# Patient Record
Sex: MALE | Race: WHITE | NOT HISPANIC OR LATINO
[De-identification: names, ages, dates, MRNs, and addresses within clinical notes are randomized per-mention and may not be internally consistent; named-entity substitution may affect disease eponyms.]

---

## 2017-10-26 PROBLEM — Z00.00 ENCOUNTER FOR PREVENTIVE HEALTH EXAMINATION: Status: ACTIVE | Noted: 2017-10-26

## 2018-06-28 ENCOUNTER — APPOINTMENT (OUTPATIENT)
Dept: MRI IMAGING | Facility: CLINIC | Age: 53
End: 2018-06-28
Payer: COMMERCIAL

## 2018-06-28 ENCOUNTER — OUTPATIENT (OUTPATIENT)
Dept: OUTPATIENT SERVICES | Facility: HOSPITAL | Age: 53
LOS: 1 days | End: 2018-06-28
Payer: COMMERCIAL

## 2018-06-28 DIAGNOSIS — Z00.8 ENCOUNTER FOR OTHER GENERAL EXAMINATION: ICD-10-CM

## 2018-06-28 PROCEDURE — 73721 MRI JNT OF LWR EXTRE W/O DYE: CPT | Mod: 26,LT

## 2018-06-28 PROCEDURE — 73721 MRI JNT OF LWR EXTRE W/O DYE: CPT

## 2019-09-11 ENCOUNTER — EMERGENCY (EMERGENCY)
Facility: HOSPITAL | Age: 54
LOS: 1 days | Discharge: HOME | End: 2019-09-11
Attending: EMERGENCY MEDICINE | Admitting: EMERGENCY MEDICINE
Payer: COMMERCIAL

## 2019-09-11 VITALS
RESPIRATION RATE: 17 BRPM | HEIGHT: 76 IN | SYSTOLIC BLOOD PRESSURE: 139 MMHG | DIASTOLIC BLOOD PRESSURE: 74 MMHG | WEIGHT: 296.96 LBS | HEART RATE: 65 BPM | TEMPERATURE: 97 F | OXYGEN SATURATION: 94 %

## 2019-09-11 LAB
ALBUMIN SERPL ELPH-MCNC: 3.5 G/DL — SIGNIFICANT CHANGE UP (ref 3.3–5)
ALP SERPL-CCNC: 82 U/L — SIGNIFICANT CHANGE UP (ref 40–120)
ALT FLD-CCNC: 13 U/L — SIGNIFICANT CHANGE UP (ref 10–45)
ANION GAP SERPL CALC-SCNC: 4 MMOL/L — LOW (ref 5–17)
APTT BLD: 30.8 SEC — SIGNIFICANT CHANGE UP (ref 27.5–36.3)
AST SERPL-CCNC: 21 U/L — SIGNIFICANT CHANGE UP (ref 10–40)
BILIRUB SERPL-MCNC: 0.2 MG/DL — SIGNIFICANT CHANGE UP (ref 0.2–1.2)
BUN SERPL-MCNC: 10 MG/DL — SIGNIFICANT CHANGE UP (ref 7–23)
CALCIUM SERPL-MCNC: 8.8 MG/DL — SIGNIFICANT CHANGE UP (ref 8.4–10.5)
CHLORIDE SERPL-SCNC: 104 MMOL/L — SIGNIFICANT CHANGE UP (ref 96–108)
CO2 SERPL-SCNC: 32 MMOL/L — HIGH (ref 22–31)
CREAT SERPL-MCNC: 0.66 MG/DL — SIGNIFICANT CHANGE UP (ref 0.5–1.3)
GLUCOSE SERPL-MCNC: 110 MG/DL — HIGH (ref 70–99)
HCT VFR BLD CALC: 42 % — SIGNIFICANT CHANGE UP (ref 39–50)
HGB BLD-MCNC: 13.9 G/DL — SIGNIFICANT CHANGE UP (ref 13–17)
INR BLD: 0.91 RATIO — SIGNIFICANT CHANGE UP (ref 0.88–1.16)
MCHC RBC-ENTMCNC: 29.3 PG — SIGNIFICANT CHANGE UP (ref 27–34)
MCHC RBC-ENTMCNC: 33.1 GM/DL — SIGNIFICANT CHANGE UP (ref 32–36)
MCV RBC AUTO: 88.6 FL — SIGNIFICANT CHANGE UP (ref 80–100)
NRBC # BLD: 0 /100 WBCS — SIGNIFICANT CHANGE UP (ref 0–0)
PLATELET # BLD AUTO: 273 K/UL — SIGNIFICANT CHANGE UP (ref 150–400)
POTASSIUM SERPL-MCNC: 3.9 MMOL/L — SIGNIFICANT CHANGE UP (ref 3.5–5.3)
POTASSIUM SERPL-SCNC: 3.9 MMOL/L — SIGNIFICANT CHANGE UP (ref 3.5–5.3)
PROT SERPL-MCNC: 6.8 G/DL — SIGNIFICANT CHANGE UP (ref 6–8.3)
PROTHROM AB SERPL-ACNC: 10.2 SEC — SIGNIFICANT CHANGE UP (ref 10–12.9)
RBC # BLD: 4.74 M/UL — SIGNIFICANT CHANGE UP (ref 4.2–5.8)
RBC # FLD: 12.8 % — SIGNIFICANT CHANGE UP (ref 10.3–14.5)
SODIUM SERPL-SCNC: 140 MMOL/L — SIGNIFICANT CHANGE UP (ref 135–145)
WBC # BLD: 12.37 K/UL — HIGH (ref 3.8–10.5)
WBC # FLD AUTO: 12.37 K/UL — HIGH (ref 3.8–10.5)

## 2019-09-11 PROCEDURE — 99284 EMERGENCY DEPT VISIT MOD MDM: CPT | Mod: 25

## 2019-09-11 PROCEDURE — 71046 X-RAY EXAM CHEST 2 VIEWS: CPT | Mod: 26

## 2019-09-11 PROCEDURE — 36415 COLL VENOUS BLD VENIPUNCTURE: CPT

## 2019-09-11 PROCEDURE — 71275 CT ANGIOGRAPHY CHEST: CPT

## 2019-09-11 PROCEDURE — 71275 CT ANGIOGRAPHY CHEST: CPT | Mod: 26

## 2019-09-11 PROCEDURE — 85730 THROMBOPLASTIN TIME PARTIAL: CPT

## 2019-09-11 PROCEDURE — 85610 PROTHROMBIN TIME: CPT

## 2019-09-11 PROCEDURE — 71046 X-RAY EXAM CHEST 2 VIEWS: CPT

## 2019-09-11 PROCEDURE — 85027 COMPLETE CBC AUTOMATED: CPT

## 2019-09-11 PROCEDURE — 99284 EMERGENCY DEPT VISIT MOD MDM: CPT

## 2019-09-11 PROCEDURE — 80053 COMPREHEN METABOLIC PANEL: CPT

## 2019-09-11 RX ORDER — SODIUM CHLORIDE 9 MG/ML
1000 INJECTION INTRAMUSCULAR; INTRAVENOUS; SUBCUTANEOUS ONCE
Refills: 0 | Status: COMPLETED | OUTPATIENT
Start: 2019-09-11 | End: 2019-09-11

## 2019-09-11 RX ADMIN — SODIUM CHLORIDE 1000 MILLILITER(S): 9 INJECTION INTRAMUSCULAR; INTRAVENOUS; SUBCUTANEOUS at 05:39

## 2019-09-11 NOTE — ED PROVIDER NOTE - NSFOLLOWUPINSTRUCTIONS_ED_ALL_ED_FT
-YOU MUST FOLLOW UP WITH DR QUEZADA AND ONCOLOGY SPECIALIST THIS WEEK FOR FURTHER EVALUATION OF COUGHING UP BLOOD AND ABNORMAL LYMPH NODES ON CHEST CATSCAN.  -RETURN FOR WORSE COUGHING/BLOOD, FEELING SHORT OF BREATH OR OTHER CONCERNS    Hemoptysis    WHAT YOU NEED TO KNOW:    Hemoptysis is coughing up blood. This occurs when blood vessels in your airway or lungs weaken or break, and begin to bleed. You may bleed in small or large amounts that appear in your sputum (spit).     DISCHARGE INSTRUCTIONS:    Return to the emergency department if:     You have new or worsening chest pain or shortness of breath.      Your bleeding gets worse or you cough up a large amount of blood.      You cannot stop vomiting.      You are so dizzy that you think you may fall or faint.      You have pain or swelling in your legs.      Your legs and arms feel cold or look pale.    Contact your healthcare provider if:     You have new or increasing shortness of breath.      You have a fever.      You lose weight without trying.      You feel more weak and tired than usual.      You have a cough that does not improve or gets worse.       You have questions or concerns about your condition or care.    Medicines: You may need any of the following:     Antibiotics may be given to fight or prevent an infection caused by bacteria. Always take your antibiotics exactly as ordered by your healthcare provider. Do not stop taking your medicine unless directed by your healthcare provider.       Antitussives help control or stop your cough.       Take your medicine as directed. Contact your healthcare provider if you think your medicine is not helping or if you have side effects. Tell him or her if you are allergic to any medicine. Keep a list of the medicines, vitamins, and herbs you take. Include the amounts, and when and why you take them. Bring the list or the pill bottles to follow-up visits. Carry your medicine list with you in case of an emergency.    Follow up with your healthcare provider in 2 days or as directed: You may need frequent visits to monitor your condition and prevent further blood loss. Write down your questions so you remember to ask them during your visits.    Use caution with medicines: Certain medicines, such as NSAIDs, increase your risk for bleeding. Herbal supplements also increase your risk. Examples of herbal supplements are garlic, gingko, and ginseng. Ask your healthcare provider before you take any over-the-counter medicines.     Do not smoke, and do not go to smoky areas: Smoke may worsen your hemoptysis. Nicotine and other chemicals in cigarettes and cigars can also cause lung damage. Ask your healthcare provider for information if you currently smoke and need help to quit. E-cigarettes or smokeless tobacco still contain nicotine. Talk to your healthcare provider before you use these products.

## 2019-09-11 NOTE — ED PROVIDER NOTE - CARE PROVIDER_API CALL
Hayes Pearce)  Family Medicine  [address] [place] [id number]  Phone: [phone]  Fax: [phone]  Follow Up Time: 1-3 Days

## 2019-09-11 NOTE — ED ADULT NURSE NOTE - CHPI ED NUR SYMPTOMS NEG
no chills/no shortness of breath/no headache/no wheezing/no chest pain/no fever/no diaphoresis/no edema

## 2019-09-11 NOTE — ED PROVIDER NOTE - CLINICAL SUMMARY MEDICAL DECISION MAKING FREE TEXT BOX
acute hemoptysis. partial ddx: pna, PE, lung mass. check labs, cxr. consider CTA chest. otherwise stable. acute hemoptysis. partial ddx: pna, PE, lung mass. check labs, cxr. consider CTA chest. otherwise stable.    0600: labs unremarkable. CT chest with multiple mediastinal/bilat hilar LN. gave copy of results to pt. d/w him possibility of lymphoma, metastatic dz.  told him he must f/u w pmd and oncologist. pt with stable normal vitals in ED. well appearing. nad. no significant hemoptysis

## 2019-09-11 NOTE — ED PROVIDER NOTE - NSFOLLOWUPCLINICS_GEN_ALL_ED_FT
[facility]  Hematology/Oncology  [address] [id number]  Phone: [phone]  Fax:   Follow Up Time: 1-3 Days

## 2019-09-11 NOTE — ED ADULT TRIAGE NOTE - CHIEF COMPLAINT QUOTE
Pt presents to ED w/ c/o coughing up bright red blood x1hr. Pt denies nausea, vomiting, pain or taking blood thinners.

## 2019-09-11 NOTE — ED PROVIDER NOTE - OBJECTIVE STATEMENT
pt c/o waking 330am today with coughing up blood, mild. states he was fine last few days, no coughing/illness. coughing phlegm with some gross blood in it, several times in last hr. assoc c feeling generalzied weakness. no chset pain, fever. no sob. no n/v.  no leg pain/swelling /recent periods of immobility.

## 2019-09-11 NOTE — ED PROVIDER NOTE - PATIENT PORTAL LINK FT
You can access the FollowMyHealth Patient Portal offered by [facility] by registering at the following website: [email or web address]. By joining [employer]’s FollowMyHealth portal, you will also be able to view your health information using other applications (apps) compatible with our system.

## 2019-09-11 NOTE — ED ADULT NURSE NOTE - OBJECTIVE STATEMENT
Pt came to ED c/o cough with blood that began at 0330 today. Pt states he did not have cough or recent illness before. Pt has been taking motrin for pain most days due to a left foot fx. Pt denies cp, sob, n/v, recent travel, or use of anticoagulants. Denies fever, chills,

## 2019-09-11 NOTE — ED ADULT NURSE NOTE - NSIMPLEMENTINTERV_GEN_ALL_ED
Implemented All Universal Safety Interventions:  [place] to call system. Call bell, personal items and telephone within reach. Instruct patient to call for assistance. Room bathroom lighting operational. Non-slip footwear when patient is off stretcher. Physically safe environment: no spills, clutter or unnecessary equipment. Stretcher in lowest position, wheels locked, appropriate side rails in place.

## 2019-09-12 ENCOUNTER — OUTPATIENT (OUTPATIENT)
Dept: OUTPATIENT SERVICES | Facility: HOSPITAL | Age: 54
LOS: 1 days | Discharge: HOME | End: 2019-09-12

## 2019-09-12 DIAGNOSIS — R59.0 LOCALIZED ENLARGED LYMPH NODES: ICD-10-CM

## 2019-09-13 ENCOUNTER — APPOINTMENT (OUTPATIENT)
Dept: HEMATOLOGY ONCOLOGY | Facility: CLINIC | Age: 54
End: 2019-09-13
Payer: COMMERCIAL

## 2019-09-13 ENCOUNTER — RESULT REVIEW (OUTPATIENT)
Age: 54
End: 2019-09-13

## 2019-09-13 VITALS
SYSTOLIC BLOOD PRESSURE: 150 MMHG | WEIGHT: 296.52 LBS | HEART RATE: 59 BPM | DIASTOLIC BLOOD PRESSURE: 80 MMHG | OXYGEN SATURATION: 98 % | RESPIRATION RATE: 15 BRPM | TEMPERATURE: 97.9 F

## 2019-09-13 DIAGNOSIS — Z80.3 FAMILY HISTORY OF MALIGNANT NEOPLASM OF BREAST: ICD-10-CM

## 2019-09-13 DIAGNOSIS — Z87.01 PERSONAL HISTORY OF PNEUMONIA (RECURRENT): ICD-10-CM

## 2019-09-13 DIAGNOSIS — Z80.8 FAMILY HISTORY OF MALIGNANT NEOPLASM OF OTHER ORGANS OR SYSTEMS: ICD-10-CM

## 2019-09-13 DIAGNOSIS — F17.200 NICOTINE DEPENDENCE, UNSPECIFIED, UNCOMPLICATED: ICD-10-CM

## 2019-09-13 LAB
ALBUMIN SERPL ELPH-MCNC: 4.5 G/DL
ALP BLD-CCNC: 71 U/L
ALT SERPL-CCNC: 12 U/L
ANION GAP SERPL CALC-SCNC: 13 MMOL/L
AST SERPL-CCNC: 15 U/L
BASOPHILS # BLD AUTO: 0.1 K/UL — SIGNIFICANT CHANGE UP (ref 0–0.2)
BASOPHILS NFR BLD AUTO: 0.5 % — SIGNIFICANT CHANGE UP (ref 0–2)
BILIRUB SERPL-MCNC: 0.3 MG/DL
BUN SERPL-MCNC: 8 MG/DL
CALCIUM SERPL-MCNC: 9.3 MG/DL
CHLORIDE SERPL-SCNC: 99 MMOL/L
CO2 SERPL-SCNC: 27 MMOL/L
CREAT SERPL-MCNC: 0.66 MG/DL
EOSINOPHIL # BLD AUTO: 0.2 K/UL — SIGNIFICANT CHANGE UP (ref 0–0.5)
EOSINOPHIL NFR BLD AUTO: 1.3 % — SIGNIFICANT CHANGE UP (ref 0–6)
GLUCOSE SERPL-MCNC: 84 MG/DL
HCT VFR BLD CALC: 42.6 % — SIGNIFICANT CHANGE UP (ref 39–50)
HGB BLD-MCNC: 13.1 G/DL — SIGNIFICANT CHANGE UP (ref 13–17)
INR PPP: 0.93 RATIO
LDH SERPL-CCNC: 196 U/L
LYMPHOCYTES # BLD AUTO: 38.7 % — SIGNIFICANT CHANGE UP (ref 13–44)
LYMPHOCYTES # BLD AUTO: 5.2 K/UL — HIGH (ref 1–3.3)
MCHC RBC-ENTMCNC: 27.3 PG — SIGNIFICANT CHANGE UP (ref 27–34)
MCHC RBC-ENTMCNC: 30.8 G/DL — LOW (ref 32–36)
MCV RBC AUTO: 88.7 FL — SIGNIFICANT CHANGE UP (ref 80–100)
MONOCYTES # BLD AUTO: 0.6 K/UL — SIGNIFICANT CHANGE UP (ref 0–0.9)
MONOCYTES NFR BLD AUTO: 4.2 % — SIGNIFICANT CHANGE UP (ref 2–14)
NEUTROPHILS # BLD AUTO: 7.4 K/UL — SIGNIFICANT CHANGE UP (ref 1.8–7.4)
NEUTROPHILS NFR BLD AUTO: 55.2 % — SIGNIFICANT CHANGE UP (ref 43–77)
PLATELET # BLD AUTO: 303 K/UL — SIGNIFICANT CHANGE UP (ref 150–400)
POTASSIUM SERPL-SCNC: 4.5 MMOL/L
PROT SERPL-MCNC: 6.7 G/DL
PSA SERPL-MCNC: 0.09 NG/ML
PT BLD: 10.7 SEC
RBC # BLD: 4.81 M/UL — SIGNIFICANT CHANGE UP (ref 4.2–5.8)
RBC # FLD: 12.1 % — SIGNIFICANT CHANGE UP (ref 10.3–14.5)
SODIUM SERPL-SCNC: 139 MMOL/L
WBC # BLD: 13.4 K/UL — HIGH (ref 3.8–10.5)
WBC # FLD AUTO: 13.4 K/UL — HIGH (ref 3.8–10.5)

## 2019-09-13 PROCEDURE — 99205 OFFICE O/P NEW HI 60 MIN: CPT

## 2019-09-13 RX ORDER — IBUPROFEN 800 MG/1
800 TABLET, FILM COATED ORAL
Refills: 0 | Status: ACTIVE | COMMUNITY

## 2019-09-13 RX ORDER — FLUTICASONE FUROATE AND VILANTEROL TRIFENATATE 100; 25 UG/1; UG/1
100-25 POWDER RESPIRATORY (INHALATION)
Refills: 0 | Status: ACTIVE | COMMUNITY

## 2019-09-13 NOTE — REVIEW OF SYSTEMS
[Patient Intake Form Reviewed] : Patient intake form was reviewed [Negative] : Allergic/Immunologic [FreeTextEntry6] : mild SOB

## 2019-09-13 NOTE — CONSULT LETTER
[Consult Letter:] : I had the pleasure of evaluating your patient, [unfilled]. [Dear  ___] : Dear  [unfilled], [Please see my note below.] : Please see my note below. [Consult Closing:] : Thank you very much for allowing me to participate in the care of this patient.  If you have any questions, please do not hesitate to contact me. [Sincerely,] : Sincerely, [FreeTextEntry3] : Judy Gilbert M.D.

## 2019-09-13 NOTE — RESULTS/DATA
[FreeTextEntry1] : PT 10.2,/INR 0.91, PTT 30.8, creatinine 0.66, calcium 8.8, total protein 6.8, SGOT 21, SGPT 13.

## 2019-09-13 NOTE — PHYSICAL EXAM
[Normal] : normal spine exam without palpable tenderness, no kyphosis or scoliosis [de-identified] : non-toxic appearing [de-identified] : supple, NT; unable to appreciate discrete LAD

## 2019-09-13 NOTE — HISTORY OF PRESENT ILLNESS
[de-identified] : 9/2019-Presented to the ED with a 1 day h/o hemoptysis. CT scan of the chest was negative for PE, but showed bilateral hilar and mediastinal lymphadenopathy.\par No further hemoptysis. Mild SOB. No complaints of chest pain, nausea/vomiting, abdominal/pelvic pain. No change in bowel or bladder habits reported. No fevers/night sweats, or anorexia.

## 2019-09-13 NOTE — ASSESSMENT
[FreeTextEntry1] : Patient with hilar/mediastinal lymphadenopathy, and leukocytosis/lymphocytosis-rule out lymphoproliferative disorder/alternative neoplasm/process. Discussed differential diagnosis of lymphadenopathy with the patient and his wife, along with further evaluation recommended. He will have additional CAT scan imaging to evaluate disease extent. Then, can decide if prudent to pursue obtaining tissue diagnosis. PB flow cytometry to be done.\par Patient and his wife were given the opportunity to ask questions. Their questions have been answered to their apparent satisfaction. They expressed their understanding and willingness to comply with recommended followup.

## 2019-09-17 ENCOUNTER — FORM ENCOUNTER (OUTPATIENT)
Age: 54
End: 2019-09-17

## 2019-09-18 ENCOUNTER — APPOINTMENT (OUTPATIENT)
Dept: CT IMAGING | Facility: CLINIC | Age: 54
End: 2019-09-18
Payer: COMMERCIAL

## 2019-09-18 ENCOUNTER — OUTPATIENT (OUTPATIENT)
Dept: OUTPATIENT SERVICES | Facility: HOSPITAL | Age: 54
LOS: 1 days | End: 2019-09-18
Payer: COMMERCIAL

## 2019-09-18 DIAGNOSIS — Z00.8 ENCOUNTER FOR OTHER GENERAL EXAMINATION: ICD-10-CM

## 2019-09-18 PROCEDURE — 70491 CT SOFT TISSUE NECK W/DYE: CPT | Mod: 26

## 2019-09-18 PROCEDURE — 70491 CT SOFT TISSUE NECK W/DYE: CPT

## 2019-09-18 PROCEDURE — 74177 CT ABD & PELVIS W/CONTRAST: CPT | Mod: 26

## 2019-09-18 PROCEDURE — 74177 CT ABD & PELVIS W/CONTRAST: CPT

## 2019-09-20 ENCOUNTER — APPOINTMENT (OUTPATIENT)
Dept: HEMATOLOGY ONCOLOGY | Facility: CLINIC | Age: 54
End: 2019-09-20
Payer: COMMERCIAL

## 2019-09-20 ENCOUNTER — RESULT REVIEW (OUTPATIENT)
Age: 54
End: 2019-09-20

## 2019-09-20 ENCOUNTER — OUTPATIENT (OUTPATIENT)
Dept: OUTPATIENT SERVICES | Facility: HOSPITAL | Age: 54
LOS: 1 days | End: 2019-09-20
Payer: COMMERCIAL

## 2019-09-20 VITALS
HEART RATE: 65 BPM | WEIGHT: 295.86 LBS | RESPIRATION RATE: 15 BRPM | SYSTOLIC BLOOD PRESSURE: 112 MMHG | TEMPERATURE: 97.7 F | OXYGEN SATURATION: 96 % | DIASTOLIC BLOOD PRESSURE: 72 MMHG

## 2019-09-20 VITALS — HEIGHT: 76 IN | BODY MASS INDEX: 36.01 KG/M2

## 2019-09-20 DIAGNOSIS — D72.820 LYMPHOCYTOSIS (SYMPTOMATIC): ICD-10-CM

## 2019-09-20 LAB
BASOPHILS # BLD AUTO: 0.1 K/UL — SIGNIFICANT CHANGE UP (ref 0–0.2)
BASOPHILS NFR BLD AUTO: 0.3 % — SIGNIFICANT CHANGE UP (ref 0–2)
EOSINOPHIL # BLD AUTO: 0.1 K/UL — SIGNIFICANT CHANGE UP (ref 0–0.5)
EOSINOPHIL NFR BLD AUTO: 0.6 % — SIGNIFICANT CHANGE UP (ref 0–6)
HCT VFR BLD CALC: 45.3 % — SIGNIFICANT CHANGE UP (ref 39–50)
HGB BLD-MCNC: 14.9 G/DL — SIGNIFICANT CHANGE UP (ref 13–17)
LYMPHOCYTES # BLD AUTO: 28.3 % — SIGNIFICANT CHANGE UP (ref 13–44)
LYMPHOCYTES # BLD AUTO: 5.2 K/UL — HIGH (ref 1–3.3)
MCHC RBC-ENTMCNC: 29.5 PG — SIGNIFICANT CHANGE UP (ref 27–34)
MCHC RBC-ENTMCNC: 33 G/DL — SIGNIFICANT CHANGE UP (ref 32–36)
MCV RBC AUTO: 89.3 FL — SIGNIFICANT CHANGE UP (ref 80–100)
MONOCYTES # BLD AUTO: 0.6 K/UL — SIGNIFICANT CHANGE UP (ref 0–0.9)
MONOCYTES NFR BLD AUTO: 3.4 % — SIGNIFICANT CHANGE UP (ref 2–14)
NEUTROPHILS # BLD AUTO: 12.3 K/UL — HIGH (ref 1.8–7.4)
NEUTROPHILS NFR BLD AUTO: 67.3 % — SIGNIFICANT CHANGE UP (ref 43–77)
PLATELET # BLD AUTO: 303 K/UL — SIGNIFICANT CHANGE UP (ref 150–400)
RBC # BLD: 5.07 M/UL — SIGNIFICANT CHANGE UP (ref 4.2–5.8)
RBC # FLD: 13.1 % — SIGNIFICANT CHANGE UP (ref 10.3–14.5)
WBC # BLD: 18.2 K/UL — HIGH (ref 3.8–10.5)
WBC # FLD AUTO: 18.2 K/UL — HIGH (ref 3.8–10.5)

## 2019-09-20 PROCEDURE — 88189 FLOWCYTOMETRY/READ 16 & >: CPT

## 2019-09-20 PROCEDURE — 99214 OFFICE O/P EST MOD 30 MIN: CPT

## 2019-09-20 PROCEDURE — 88185 FLOWCYTOMETRY/TC ADD-ON: CPT

## 2019-09-20 PROCEDURE — 88184 FLOWCYTOMETRY/ TC 1 MARKER: CPT

## 2019-09-20 PROCEDURE — 87205 SMEAR GRAM STAIN: CPT

## 2019-09-20 NOTE — RESULTS/DATA
[FreeTextEntry1] : CT A/P:\par IMPRESSION: \par No evidence of malignancy in the abdomen and pelvis. \par Minimal ground glass opacities in the lung bases of uncertain significance.\par CT Neck:\par IMPRESSION: \par Moderate nonspecific soft tissue is noted at the tongue base. Although this could reflect lymphoid \par hypertrophy, lymphoma or other tongue base mass can't be entirely excluded. Correlate with direct \par visualization findings. The adenoids of the nasopharynx and palatine tonsils are not enlarged. The \par remainder of the aerodigestive tract is grossly unremarkable. \par Nonspecific mildly enlarged lymph nodes are present in the bilateral levels 3 and left level 2 \par locations. No necrotic lymph nodes are present.

## 2019-09-20 NOTE — PHYSICAL EXAM
[Normal] : affect appropriate [de-identified] : supple, NT; shotty cervical LN's [de-identified] : non-toxic appearing

## 2019-09-20 NOTE — PHYSICAL EXAM
[Normal] : grossly intact [de-identified] : non-toxic appearing [de-identified] : supple, NT; shotty cervical LN's

## 2019-09-20 NOTE — HISTORY OF PRESENT ILLNESS
[de-identified] : Persistent intermittent SOB. No further hemoptysis. No complaints of chest pain, nausea/vomiting, abdominal/pelvic pain. No change in bowel or bladder habits reported. No fevers/night sweats, or anorexia. [de-identified] : 9/2019-Presented to the ED with a 1 day h/o hemoptysis. CT scan of the chest was negative for PE, but showed bilateral hilar and mediastinal lymphadenopathy.\par

## 2019-09-20 NOTE — PHYSICAL EXAM
[Normal] : grossly intact [de-identified] : non-toxic appearing [de-identified] : supple, NT; shotty cervical LN's

## 2019-09-20 NOTE — HISTORY OF PRESENT ILLNESS
[de-identified] : Persistent intermittent SOB. No further hemoptysis. No complaints of chest pain, nausea/vomiting, abdominal/pelvic pain. No change in bowel or bladder habits reported. No fevers/night sweats, or anorexia. [de-identified] : 9/2019-Presented to the ED with a 1 day h/o hemoptysis. CT scan of the chest was negative for PE, but showed bilateral hilar and mediastinal lymphadenopathy.\par

## 2019-09-20 NOTE — PHYSICAL EXAM
[Normal] : affect appropriate [de-identified] : supple, NT; shotty cervical LN's [de-identified] : non-toxic appearing

## 2019-09-20 NOTE — ASSESSMENT
[FreeTextEntry1] : Patient with hilar/mediastinal lymphadenopathy, and leukocytosis/lymphocytosis-discussed differential diagnosis with him and his wife. May have underlying lymphoproliferative disorder. Recommend peripheral blood flow cytometry (not done yet). \par In addition, he will see Dr. Frank (otolaryngology), with whom I discussed case today, for head and neck evaluation-? base of tongue abnormality on recent CT scan of the neck.\par Pending the above, may need to consider obtaining tissue diagnosis with for example, lymph node biopsy/bone marrow evaluation.\par If underlying lymphoproliferative disorder/CLL-explained potential complications and treatment available.\par Patient and his wife were given the opportunity to ask questions. Their questions have been answered to their apparent satisfaction at this time.

## 2019-09-20 NOTE — HISTORY OF PRESENT ILLNESS
[de-identified] : Persistent intermittent SOB. No further hemoptysis. No complaints of chest pain, nausea/vomiting, abdominal/pelvic pain. No change in bowel or bladder habits reported. No fevers/night sweats, or anorexia. [de-identified] : 9/2019-Presented to the ED with a 1 day h/o hemoptysis. CT scan of the chest was negative for PE, but showed bilateral hilar and mediastinal lymphadenopathy.\par

## 2019-09-20 NOTE — HISTORY OF PRESENT ILLNESS
[de-identified] : Persistent intermittent SOB. No further hemoptysis. No complaints of chest pain, nausea/vomiting, abdominal/pelvic pain. No change in bowel or bladder habits reported. No fevers/night sweats, or anorexia. [de-identified] : 9/2019-Presented to the ED with a 1 day h/o hemoptysis. CT scan of the chest was negative for PE, but showed bilateral hilar and mediastinal lymphadenopathy.\par

## 2019-09-21 LAB
DEPRECATED KAPPA LC FREE/LAMBDA SER: 1.07 RATIO
DEPRECATED KAPPA LC FREE/LAMBDA SER: 1.07 RATIO
IGA SER QL IEP: 162 MG/DL
IGG SER QL IEP: 695 MG/DL
IGM SER QL IEP: 43 MG/DL
KAPPA LC CSF-MCNC: 1.01 MG/DL
KAPPA LC CSF-MCNC: 1.01 MG/DL
KAPPA LC SERPL-MCNC: 1.08 MG/DL
KAPPA LC SERPL-MCNC: 1.08 MG/DL

## 2019-09-23 ENCOUNTER — OTHER (OUTPATIENT)
Age: 54
End: 2019-09-23

## 2019-09-23 LAB
ALBUMIN MFR SERPL ELPH: 60.5 %
ALBUMIN SERPL-MCNC: 3.9 G/DL
ALBUMIN/GLOB SERPL: 1.5 RATIO
ALPHA1 GLOB MFR SERPL ELPH: 5.2 %
ALPHA1 GLOB SERPL ELPH-MCNC: 0.3 G/DL
ALPHA2 GLOB MFR SERPL ELPH: 11.2 %
ALPHA2 GLOB SERPL ELPH-MCNC: 0.7 G/DL
B-GLOBULIN MFR SERPL ELPH: 13.2 %
B-GLOBULIN SERPL ELPH-MCNC: 0.9 G/DL
GAMMA GLOB FLD ELPH-MCNC: 0.6 G/DL
GAMMA GLOB MFR SERPL ELPH: 9.9 %
INTERPRETATION SERPL IEP-IMP: NORMAL
PROT SERPL-MCNC: 6.5 G/DL
TM INTERPRETATION: SIGNIFICANT CHANGE UP

## 2019-09-28 ENCOUNTER — FORM ENCOUNTER (OUTPATIENT)
Age: 54
End: 2019-09-28

## 2019-09-29 ENCOUNTER — OUTPATIENT (OUTPATIENT)
Dept: OUTPATIENT SERVICES | Facility: HOSPITAL | Age: 54
LOS: 1 days | End: 2019-09-29
Payer: COMMERCIAL

## 2019-09-29 ENCOUNTER — APPOINTMENT (OUTPATIENT)
Dept: NUCLEAR MEDICINE | Facility: CLINIC | Age: 54
End: 2019-09-29
Payer: COMMERCIAL

## 2019-09-29 DIAGNOSIS — D72.820 LYMPHOCYTOSIS (SYMPTOMATIC): ICD-10-CM

## 2019-09-29 DIAGNOSIS — R59.0 LOCALIZED ENLARGED LYMPH NODES: ICD-10-CM

## 2019-09-29 PROCEDURE — 78816 PET IMAGE W/CT FULL BODY: CPT | Mod: 26,PI

## 2019-09-29 PROCEDURE — A9552: CPT

## 2019-09-29 PROCEDURE — 78816 PET IMAGE W/CT FULL BODY: CPT

## 2019-10-03 ENCOUNTER — APPOINTMENT (OUTPATIENT)
Dept: HEMATOLOGY ONCOLOGY | Facility: CLINIC | Age: 54
End: 2019-10-03
Payer: COMMERCIAL

## 2019-10-03 VITALS
TEMPERATURE: 98.4 F | HEART RATE: 63 BPM | WEIGHT: 295.19 LBS | DIASTOLIC BLOOD PRESSURE: 76 MMHG | SYSTOLIC BLOOD PRESSURE: 146 MMHG | RESPIRATION RATE: 16 BRPM | OXYGEN SATURATION: 96 % | BODY MASS INDEX: 35.93 KG/M2

## 2019-10-03 PROCEDURE — 99215 OFFICE O/P EST HI 40 MIN: CPT

## 2019-10-03 NOTE — ADDENDUM
[FreeTextEntry1] : After our visit today, patient contacted Dr. Frank's office and saw him in ENT consultation.\par I spoke with Dr. Frank who reported that patient's head and neck exam showed no obvious concerning lesion (including attention to tongue base). Mild enlargement of the lingual tonsils bilaterally, symmetric. He recommended three-month followup with him.\par Telephone call then to patient-recommended he see thoracic surgeon in consultation-to obtain excisional lymph node biopsy/tissue diagnosis. \par Case discussed with thoracic surgeon Dr. Heart today, who will see patient in consultation.

## 2019-10-03 NOTE — CONSULT LETTER
[Dear  ___] : Dear  [unfilled], [Courtesy Letter:] : I had the pleasure of seeing your patient, [unfilled], in my office today. [Please see my note below.] : Please see my note below. [Consult Closing:] : Thank you very much for allowing me to participate in the care of this patient.  If you have any questions, please do not hesitate to contact me. [Sincerely,] : Sincerely, [FreeTextEntry3] : Judy Gilbert M.D.

## 2019-10-03 NOTE — HISTORY OF PRESENT ILLNESS
[de-identified] : 9/2019-Presented to the ED with a 1 day h/o hemoptysis. 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 [de-identified] : Generally feeling well. Has occasional shortness of breath, which is not limiting his activity currently. He is working 2 jobs. No complaints of cough or hemoptysis. No fevers. No odynophagia/dysphagia. Has not yet seen ENT physician. \par Good appetite. No fevers/night sweats. No abnormal bruising or bleeding reported.\par Patient's wife and his sister present today.

## 2019-10-03 NOTE — ASSESSMENT
[FreeTextEntry1] : Patient with lymphocytosis and mediastinal lymphadenopathy. Peripheral blood flow cytometry suggestive of a lymphoproliferative disorder/lymphoma. Reviewed lab work and PET/CT scan results with patient and family in detail. Recommend patient see otolaryngologist for evaluation. Pending this, may need to consider mediastinal lymph node sampling. Discussed likelihood for bone marrow biopsy as well during course. Explained potential treatment options pending further tissue diagnosis.\par Patient and his family were given the opportunity to ask questions. Their questions have been answered to their apparent satisfaction at this time.

## 2019-10-03 NOTE — RESULTS/DATA
[FreeTextEntry1] : IMPRESSION: Whole body FDG-PET/CT scan: \par 1. FDG-avid base of tongue soft tissue. Please correlate with direct visualization and tissue \par sampling, if indicated. \par 2. Nonspecific mildly FDG-avid left level II node and bilateral level III cervical nodes. Subcentimeter \par bilateral levels I-V cervical nodes, too small to characterize. \par 3. FDG avid mildly enlarged mediastinal and bilateral hilar nodes.

## 2019-10-03 NOTE — PHYSICAL EXAM
[Normal] : affect appropriate [de-identified] : non-toxic appearing [de-identified] : supple, NT; shotty cervical LN's

## 2019-10-10 ENCOUNTER — OTHER (OUTPATIENT)
Age: 54
End: 2019-10-10

## 2019-10-10 ENCOUNTER — APPOINTMENT (OUTPATIENT)
Dept: THORACIC SURGERY | Facility: CLINIC | Age: 54
End: 2019-10-10
Payer: COMMERCIAL

## 2019-10-10 VITALS
DIASTOLIC BLOOD PRESSURE: 82 MMHG | SYSTOLIC BLOOD PRESSURE: 133 MMHG | OXYGEN SATURATION: 95 % | BODY MASS INDEX: 35.92 KG/M2 | WEIGHT: 295 LBS | RESPIRATION RATE: 16 BRPM | TEMPERATURE: 98.4 F | HEIGHT: 76 IN | HEART RATE: 63 BPM

## 2019-10-10 PROCEDURE — 99205 OFFICE O/P NEW HI 60 MIN: CPT

## 2019-10-10 RX ORDER — BUPROPION HYDROCHLORIDE 100 MG/1
TABLET, FILM COATED ORAL
Refills: 0 | Status: DISCONTINUED | COMMUNITY
End: 2019-10-10

## 2019-10-10 RX ORDER — OXYCODONE 5 MG/1
5 TABLET ORAL
Refills: 0 | Status: DISCONTINUED | COMMUNITY
End: 2019-10-10

## 2019-10-10 NOTE — CONSULT LETTER
[Consult Letter:] : I had the pleasure of evaluating your patient, [unfilled]. [( Thank you for referring [unfilled] for consultation for _____ )] : Thank you for referring [unfilled] for consultation for [unfilled] [Please see my note below.] : Please see my note below. [Sincerely,] : Sincerely, [FreeTextEntry2] : Dr. Judy Sidhu [FreeTextEntry3] : Dc Heart MD, FACS \par , Division of Thoracic Surgery \par [facility] \par Chief, Thoracic Surgery \par [facility] \par Department of Cardiovascular & Thoracic Surgery \par  \par [facility] [employer] [facility]\par \par

## 2019-10-10 NOTE — ASSESSMENT
[FreeTextEntry1] : 54 year old M presenting for a consultation. Dr. Judy Sidhu referred him here for a consultation for mediastinal lymphadenopathy. He presents with complaints of fatigue and loss of weight. \par \par PET CT done on 9/29/2019:\par FDG avid mediastinal and bilateral hilar nodes. Reference nodes: -subcarinal lymph node, 2.5 x 1.8 cm (image 142), SUV 5.3.  -paratracheal node, 2.0 x 2.1 cm (image 127), SUV 5.8. -difficult to delineate right hilar node SUV 5.3 (image 140).\par LUNGS: No abnormal FDG activity. No lung nodule.  Bilateral pulmonary mosaic attenuation compatible with small airway/vessel disease.\par 1. FDG-avid base of tongue soft tissue. Please correlate with direct visualization and tissue sampling, if indicated. \par 2. Nonspecific mildly FDG-avid left level II node and bilateral level III cervical nodes. Subcentimeter bilateral levels I-V cervical nodes, too small to characterize. \par 3. FDG avid mildly enlarged mediastinal and bilateral hilar nodes.\par \par CT scan done on 9/11/2019:\par Bilateral hilar and mediastinal lymphadenopathy. \par \par I have reviewed the patient's medical records and diagnostic images during the time of this office visit, and I have made the following recommendation: \par Plan:\par 1. Schedule for Mediastinoscopy.\par 2. Medical clearance prior to procedure.\par \par Written by  Hina Hernandez RN  acting as a scribe for  Dr. Dc Heart.\par “The documentation recorded by the scribe accurately reflects the service I personally performed and the decisions made by me. By Dr. Dc Heart.\par \par \par

## 2019-10-10 NOTE — REVIEW OF SYSTEMS
[Recent Weight Loss (___ Lbs)] : recent [unfilled] ~Ulb weight loss [Shortness Of Breath] : shortness of breath [Cough] : cough [Negative] : Heme/Lymph

## 2019-10-10 NOTE — HISTORY OF PRESENT ILLNESS
[FreeTextEntry1] : JESSY HAQUE is a 54 year old M presenting for a consultation. Dr. Judy Sidhu referred him here for a consultation for mediastinal lymphadenopathy. He was seen in the ED with Hemoptysis approximately a month ago. Work up included CT scan which showed bilateral hilar and mediastinal lymphadenopathy. His PCP send him to Dr. Judy Sidhu for further evaluation.\par He presents today with fatigue and occasional dry cough. He also reports shortness of breath some times at rest. He noticed that his dyspnea improved from a couple of weeks ago but he feels that it is still there. He still works full time. any of his symptoms does not limit any of his activities. he lost 15- 20  lbs in 2 months. He says he has the same appetite and eating the same way as always.\par \par He is a current smoker. He has been smoking for 30 + years. 1 PPD. He is planning to quit smoking. He had pneumonia in 2006 and was hospitalized for that. History of Fracture of his Left foot last year and recurrent bronchitis. LAst time he was treated for bronchitis was last year.\par \par PET CT done on 9/29/2019:\par FDG avid mediastinal and bilateral hilar nodes. Reference nodes: -subcarinal lymph node, 2.5 x 1.8 cm (image 142), SUV 5.3.  -paratracheal node, 2.0 x 2.1 cm (image 127), SUV 5.8. -difficult to delineate right hilar node SUV 5.3 (image 140).\par LUNGS: No abnormal FDG activity. No lung nodule.  Bilateral pulmonary mosaic attenuation compatible with small airway/vessel disease.\par 1. FDG-avid base of tongue soft tissue. Please correlate with direct visualization and tissue sampling, if indicated. \par 2. Nonspecific mildly FDG-avid left level II node and bilateral level III cervical nodes. Subcentimeter bilateral levels I-V cervical nodes, too small to characterize. \par 3. FDG avid mildly enlarged mediastinal and bilateral hilar nodes.\par \par CT scan done on 9/11/2019:\par Bilateral hilar and mediastinal lymphadenopathy. \par \par

## 2019-10-10 NOTE — PHYSICAL EXAM
[General Appearance - Alert] : alert [Sclera] : the sclera and conjunctiva were normal [Strabismus] : no strabismus was seen [Outer Ear] : the ears and nose were normal in appearance [Hearing Threshold Finger Rub Not [place]] : hearing was normal [Neck Appearance] : the appearance of the neck was normal [Jugular Venous Distention Increased] : there was no jugular-venous distention [] : no respiratory distress [Respiration, Rhythm And Depth] : normal respiratory rhythm and effort [Auscultation Breath Sounds / Voice Sounds] : lungs were clear to auscultation bilaterally [Heart Rate And Rhythm] : heart rate was normal and rhythm regular [Heart Sounds] : normal S1 and S2 [Murmurs] : no murmurs [Regular] : the rhythm was regular [Examination Of The Chest] : the chest was normal in appearance [2+] : left 2+ [No Abnormalities] : the abdominal aorta was not enlarged and no bruit was heard [Bowel Sounds] : normal bowel sounds [Abdomen Soft] : soft [Abdomen Tenderness] : non-tender [No CVA Tenderness] : no ~M costovertebral angle tenderness [Abnormal Walk] : normal gait [Skin Color & Pigmentation] : normal skin color and pigmentation [Skin Turgor] : normal skin turgor [No Focal Deficits] : no focal deficits [Oriented To Time, Place, And Person] : oriented to person, place, and time [Impaired Insight] : insight and judgment were intact [Right Carotid Bruit] : no bruit heard over the right carotid [Left Carotid Bruit] : no bruit heard over the left carotid [Right Femoral Bruit] : no bruit heard over the right femoral artery [Left Femoral Bruit] : no bruit heard over the left femoral artery [FreeTextEntry1] : Deferred

## 2019-10-11 ENCOUNTER — OUTPATIENT (OUTPATIENT)
Dept: OUTPATIENT SERVICES | Facility: HOSPITAL | Age: 54
LOS: 1 days | End: 2019-10-11
Payer: COMMERCIAL

## 2019-10-11 ENCOUNTER — OTHER (OUTPATIENT)
Age: 54
End: 2019-10-11

## 2019-10-11 VITALS
WEIGHT: 287.92 LBS | HEIGHT: 76 IN | HEART RATE: 85 BPM | SYSTOLIC BLOOD PRESSURE: 136 MMHG | RESPIRATION RATE: 18 BRPM | DIASTOLIC BLOOD PRESSURE: 84 MMHG | TEMPERATURE: 98 F | OXYGEN SATURATION: 97 %

## 2019-10-11 DIAGNOSIS — R59.0 LOCALIZED ENLARGED LYMPH NODES: ICD-10-CM

## 2019-10-11 LAB
ANION GAP SERPL CALC-SCNC: 12 MMO/L — SIGNIFICANT CHANGE UP (ref 7–14)
ANISOCYTOSIS BLD QL: SLIGHT — SIGNIFICANT CHANGE UP
BASOPHILS # BLD AUTO: 0.05 K/UL — SIGNIFICANT CHANGE UP (ref 0–0.2)
BASOPHILS NFR BLD AUTO: 0.4 % — SIGNIFICANT CHANGE UP (ref 0–2)
BASOPHILS NFR SPEC: 0 % — SIGNIFICANT CHANGE UP (ref 0–2)
BLASTS # FLD: 0 % — SIGNIFICANT CHANGE UP (ref 0–0)
BLD GP AB SCN SERPL QL: NEGATIVE — SIGNIFICANT CHANGE UP
BUN SERPL-MCNC: 11 MG/DL — SIGNIFICANT CHANGE UP (ref 7–23)
CALCIUM SERPL-MCNC: 9.2 MG/DL — SIGNIFICANT CHANGE UP (ref 8.4–10.5)
CHLORIDE SERPL-SCNC: 99 MMOL/L — SIGNIFICANT CHANGE UP (ref 98–107)
CO2 SERPL-SCNC: 26 MMOL/L — SIGNIFICANT CHANGE UP (ref 22–31)
CREAT SERPL-MCNC: 0.62 MG/DL — SIGNIFICANT CHANGE UP (ref 0.5–1.3)
EOSINOPHIL # BLD AUTO: 0.13 K/UL — SIGNIFICANT CHANGE UP (ref 0–0.5)
EOSINOPHIL NFR BLD AUTO: 1 % — SIGNIFICANT CHANGE UP (ref 0–6)
EOSINOPHIL NFR FLD: 1.7 % — SIGNIFICANT CHANGE UP (ref 0–6)
GLUCOSE SERPL-MCNC: 95 MG/DL — SIGNIFICANT CHANGE UP (ref 70–99)
HCT VFR BLD CALC: 43.1 % — SIGNIFICANT CHANGE UP (ref 39–50)
HGB BLD-MCNC: 13.8 G/DL — SIGNIFICANT CHANGE UP (ref 13–17)
IMM GRANULOCYTES NFR BLD AUTO: 0.5 % — SIGNIFICANT CHANGE UP (ref 0–1.5)
LYMPHOCYTES # BLD AUTO: 31.4 % — SIGNIFICANT CHANGE UP (ref 13–44)
LYMPHOCYTES # BLD AUTO: 4.16 K/UL — HIGH (ref 1–3.3)
LYMPHOCYTES NFR SPEC AUTO: 18.3 % — SIGNIFICANT CHANGE UP (ref 13–44)
MACROCYTES BLD QL: SLIGHT — SIGNIFICANT CHANGE UP
MCHC RBC-ENTMCNC: 27.9 PG — SIGNIFICANT CHANGE UP (ref 27–34)
MCHC RBC-ENTMCNC: 32 % — SIGNIFICANT CHANGE UP (ref 32–36)
MCV RBC AUTO: 87.1 FL — SIGNIFICANT CHANGE UP (ref 80–100)
METAMYELOCYTES # FLD: 0 % — SIGNIFICANT CHANGE UP (ref 0–1)
MONOCYTES # BLD AUTO: 0.56 K/UL — SIGNIFICANT CHANGE UP (ref 0–0.9)
MONOCYTES NFR BLD AUTO: 4.2 % — SIGNIFICANT CHANGE UP (ref 2–14)
MONOCYTES NFR BLD: 3.5 % — SIGNIFICANT CHANGE UP (ref 2–9)
MYELOCYTES NFR BLD: 0 % — SIGNIFICANT CHANGE UP (ref 0–0)
NEUTROPHIL AB SER-ACNC: 67.8 % — SIGNIFICANT CHANGE UP (ref 43–77)
NEUTROPHILS # BLD AUTO: 8.27 K/UL — HIGH (ref 1.8–7.4)
NEUTROPHILS NFR BLD AUTO: 62.5 % — SIGNIFICANT CHANGE UP (ref 43–77)
NEUTS BAND # BLD: 0 % — SIGNIFICANT CHANGE UP (ref 0–6)
NRBC # FLD: 0 K/UL — SIGNIFICANT CHANGE UP (ref 0–0)
OTHER - HEMATOLOGY %: 0 — SIGNIFICANT CHANGE UP
PLATELET # BLD AUTO: 293 K/UL — SIGNIFICANT CHANGE UP (ref 150–400)
PLATELET COUNT - ESTIMATE: NORMAL — SIGNIFICANT CHANGE UP
PMV BLD: 9.3 FL — SIGNIFICANT CHANGE UP (ref 7–13)
POTASSIUM SERPL-MCNC: 4.5 MMOL/L — SIGNIFICANT CHANGE UP (ref 3.5–5.3)
POTASSIUM SERPL-SCNC: 4.5 MMOL/L — SIGNIFICANT CHANGE UP (ref 3.5–5.3)
PROMYELOCYTES # FLD: 0 % — SIGNIFICANT CHANGE UP (ref 0–0)
RBC # BLD: 4.95 M/UL — SIGNIFICANT CHANGE UP (ref 4.2–5.8)
RBC # FLD: 12.7 % — SIGNIFICANT CHANGE UP (ref 10.3–14.5)
REVIEW TO FOLLOW: YES — SIGNIFICANT CHANGE UP
RH IG SCN BLD-IMP: NEGATIVE — SIGNIFICANT CHANGE UP
SODIUM SERPL-SCNC: 137 MMOL/L — SIGNIFICANT CHANGE UP (ref 135–145)
VARIANT LYMPHS # BLD: 8.7 % — SIGNIFICANT CHANGE UP
WBC # BLD: 13.23 K/UL — HIGH (ref 3.8–10.5)
WBC # FLD AUTO: 13.23 K/UL — HIGH (ref 3.8–10.5)

## 2019-10-11 PROCEDURE — 93010 ELECTROCARDIOGRAM REPORT: CPT

## 2019-10-11 RX ORDER — SODIUM CHLORIDE 9 MG/ML
1000 INJECTION, SOLUTION INTRAVENOUS
Refills: 0 | Status: DISCONTINUED | OUTPATIENT
Start: 2019-10-15 | End: 2019-11-04

## 2019-10-11 RX ORDER — SODIUM CHLORIDE 9 MG/ML
3 INJECTION INTRAMUSCULAR; INTRAVENOUS; SUBCUTANEOUS EVERY 8 HOURS
Refills: 0 | Status: DISCONTINUED | OUTPATIENT
Start: 2019-10-15 | End: 2019-11-04

## 2019-10-11 NOTE — H&P PST ADULT - NEGATIVE GASTROINTESTINAL SYMPTOMS
no melena/no jaundice/no hiccoughs/no abdominal pain/no constipation/no change in bowel habits/no nausea/no vomiting/no diarrhea

## 2019-10-11 NOTE — H&P PST ADULT - RS GEN PE MLT RESP DETAILS PC
no subcutaneous emphysema/respirations non-labored/clear to auscultation bilaterally/no chest wall tenderness/no rhonchi/no wheezes/no intercostal retractions/airway patent/breath sounds equal/good air movement/no rales

## 2019-10-11 NOTE — H&P PST ADULT - NEGATIVE OPHTHALMOLOGIC SYMPTOMS
no irritation R/no lacrimation L/no lacrimation R/no photophobia/no blurred vision L/no blurred vision R/no discharge R/no pain R/no loss of vision L/no discharge L/no pain L/no irritation L/no loss of vision R/no diplopia

## 2019-10-11 NOTE — H&P PST ADULT - NSICDXPROBLEM_GEN_ALL_CORE_FT
PROBLEM DIAGNOSES  Problem: Localized enlarged lymph nodes  Assessment and Plan: Scheduled for mediastinoscopy on 10/15/19. Pre op instructions, famotidine , chlorhexidine gluconate soap given and explained. Pt verbalized understanding.

## 2019-10-11 NOTE — H&P PST ADULT - HISTORY OF PRESENT ILLNESS
hemoptysis 1 month ago, was seen at [facility] ED, S/P  CT scan of chest showed enlarged lymph node. 53 y/o  male presents to PST for pre op evaluation with h/o hemoptysis 1 month ago, was seen at [facility] ED, S/P CT scan of chest showed enlarged lymph nodes. Scheduled for mediastinoscopy on 10/15/19

## 2019-10-11 NOTE — H&P PST ADULT - NSICDXPASTMEDICALHX_GEN_ALL_CORE_FT
PAST MEDICAL HISTORY:  PNA (pneumonia) 2007 PAST MEDICAL HISTORY:  Localized enlarged lymph nodes     PNA (pneumonia) 2007

## 2019-10-11 NOTE — H&P PST ADULT - REASON FOR ADMISSION
" I'm having a  biopsy because my lymph node in the chest are swollen" " I'm having a biopsy because my lymph node in the chest are swollen"

## 2019-10-11 NOTE — H&P PST ADULT - NEGATIVE CARDIOVASCULAR SYMPTOMS
no orthopnea/no paroxysmal nocturnal dyspnea/no claudication/no chest pain/no peripheral edema/no dyspnea on exertion/no palpitations

## 2019-10-14 ENCOUNTER — FORM ENCOUNTER (OUTPATIENT)
Age: 54
End: 2019-10-14

## 2019-10-14 ENCOUNTER — TRANSCRIPTION ENCOUNTER (OUTPATIENT)
Age: 54
End: 2019-10-14

## 2019-10-15 ENCOUNTER — RESULT REVIEW (OUTPATIENT)
Age: 54
End: 2019-10-15

## 2019-10-15 ENCOUNTER — OUTPATIENT (OUTPATIENT)
Dept: OUTPATIENT SERVICES | Facility: HOSPITAL | Age: 54
LOS: 1 days | Discharge: HOME | End: 2019-10-15
Payer: COMMERCIAL

## 2019-10-15 ENCOUNTER — APPOINTMENT (OUTPATIENT)
Dept: THORACIC SURGERY | Facility: HOSPITAL | Age: 54
End: 2019-10-15

## 2019-10-15 ENCOUNTER — OUTPATIENT (OUTPATIENT)
Dept: OUTPATIENT SERVICES | Facility: HOSPITAL | Age: 54
LOS: 1 days | End: 2019-10-15
Payer: COMMERCIAL

## 2019-10-15 VITALS
SYSTOLIC BLOOD PRESSURE: 119 MMHG | HEART RATE: 63 BPM | HEIGHT: 76 IN | WEIGHT: 287.04 LBS | TEMPERATURE: 98 F | RESPIRATION RATE: 18 BRPM | OXYGEN SATURATION: 92 % | DIASTOLIC BLOOD PRESSURE: 63 MMHG

## 2019-10-15 VITALS
SYSTOLIC BLOOD PRESSURE: 114 MMHG | HEART RATE: 60 BPM | RESPIRATION RATE: 15 BRPM | TEMPERATURE: 98 F | DIASTOLIC BLOOD PRESSURE: 66 MMHG | OXYGEN SATURATION: 97 %

## 2019-10-15 DIAGNOSIS — R59.0 LOCALIZED ENLARGED LYMPH NODES: ICD-10-CM

## 2019-10-15 DIAGNOSIS — C85.90 NON-HODGKIN LYMPHOMA, UNSPECIFIED, UNSPECIFIED SITE: ICD-10-CM

## 2019-10-15 PROBLEM — J18.9 PNEUMONIA, UNSPECIFIED ORGANISM: Chronic | Status: ACTIVE | Noted: 2019-10-11

## 2019-10-15 LAB — RH IG SCN BLD-IMP: NEGATIVE — SIGNIFICANT CHANGE UP

## 2019-10-15 PROCEDURE — 88237 TISSUE CULTURE BONE MARROW: CPT

## 2019-10-15 PROCEDURE — 71045 X-RAY EXAM CHEST 1 VIEW: CPT | Mod: 26

## 2019-10-15 PROCEDURE — 88360 TUMOR IMMUNOHISTOCHEM/MANUAL: CPT | Mod: 26

## 2019-10-15 PROCEDURE — 88305 TISSUE EXAM BY PATHOLOGIST: CPT | Mod: 26

## 2019-10-15 PROCEDURE — 88334 PATH CONSLTJ SURG CYTO XM EA: CPT | Mod: 26,59

## 2019-10-15 PROCEDURE — 39402 MEDIASTINOSCPY W/LMPH NOD BX: CPT

## 2019-10-15 PROCEDURE — 88342 IMHCHEM/IMCYTCHM 1ST ANTB: CPT | Mod: 26,59

## 2019-10-15 PROCEDURE — G0452: CPT

## 2019-10-15 PROCEDURE — 88331 PATH CONSLTJ SURG 1 BLK 1SPC: CPT | Mod: 26

## 2019-10-15 PROCEDURE — 88189 FLOWCYTOMETRY/READ 16 & >: CPT

## 2019-10-15 PROCEDURE — 88341 IMHCHEM/IMCYTCHM EA ADD ANTB: CPT | Mod: 26,59

## 2019-10-15 PROCEDURE — 88307 TISSUE EXAM BY PATHOLOGIST: CPT | Mod: 26

## 2019-10-15 RX ORDER — ONDANSETRON 8 MG/1
4 TABLET, FILM COATED ORAL ONCE
Refills: 0 | Status: DISCONTINUED | OUTPATIENT
Start: 2019-10-15 | End: 2019-11-04

## 2019-10-15 RX ORDER — HYDROMORPHONE HYDROCHLORIDE 2 MG/ML
0.5 INJECTION INTRAMUSCULAR; INTRAVENOUS; SUBCUTANEOUS
Refills: 0 | Status: DISCONTINUED | OUTPATIENT
Start: 2019-10-15 | End: 2019-10-15

## 2019-10-15 RX ORDER — METOCLOPRAMIDE HCL 10 MG
10 TABLET ORAL ONCE
Refills: 0 | Status: DISCONTINUED | OUTPATIENT
Start: 2019-10-15 | End: 2019-11-04

## 2019-10-15 NOTE — ASU DISCHARGE PLAN (ADULT/PEDIATRIC) - CALL YOUR DOCTOR IF YOU HAVE ANY OF THE FOLLOWING:
Bleeding that does not stop/Fever greater than (need to indicate Fahrenheit or Celsius) Bleeding that does not stop/Pain not relieved by Medications/Fever greater than (need to indicate Fahrenheit or Celsius)/Inability to tolerate liquids or foods/Nausea and vomiting that does not stop/Unable to urinate

## 2019-10-15 NOTE — ASU DISCHARGE PLAN (ADULT/PEDIATRIC) - CARE PROVIDER_API CALL
Dc Heart)  Thoracic Surgery  [id number] [place] [address]  Phone: [phone]  Fax: [phone]  Follow Up Time:

## 2019-10-17 LAB — TM INTERPRETATION: SIGNIFICANT CHANGE UP

## 2019-10-21 ENCOUNTER — OUTPATIENT (OUTPATIENT)
Dept: OUTPATIENT SERVICES | Facility: HOSPITAL | Age: 54
LOS: 1 days | Discharge: HOME | End: 2019-10-21
Payer: COMMERCIAL

## 2019-10-21 DIAGNOSIS — R59.0 LOCALIZED ENLARGED LYMPH NODES: ICD-10-CM

## 2019-10-21 LAB
CHROM ANALY OVERALL INTERP SPEC-IMP: SIGNIFICANT CHANGE UP
SURGICAL PATHOLOGY STUDY: SIGNIFICANT CHANGE UP

## 2019-10-22 PROCEDURE — G0452: CPT | Mod: 26

## 2019-10-23 ENCOUNTER — OTHER (OUTPATIENT)
Age: 54
End: 2019-10-23

## 2019-10-30 ENCOUNTER — APPOINTMENT (OUTPATIENT)
Dept: HEMATOLOGY ONCOLOGY | Facility: CLINIC | Age: 54
End: 2019-10-30
Payer: COMMERCIAL

## 2019-10-30 VITALS
WEIGHT: 294.3 LBS | DIASTOLIC BLOOD PRESSURE: 87 MMHG | RESPIRATION RATE: 15 BRPM | BODY MASS INDEX: 35.82 KG/M2 | OXYGEN SATURATION: 100 % | SYSTOLIC BLOOD PRESSURE: 134 MMHG | TEMPERATURE: 98.1 F | HEART RATE: 66 BPM

## 2019-10-30 PROCEDURE — 99214 OFFICE O/P EST MOD 30 MIN: CPT

## 2019-10-30 NOTE — REVIEW OF SYSTEMS
[Negative] : Allergic/Immunologic [Fever] : no fever [Chest Pain] : no chest pain [Cough] : no cough [Easy Bleeding] : no tendency for easy bleeding

## 2019-10-30 NOTE — HISTORY OF PRESENT ILLNESS
[de-identified] : 9/2019-Presented to the ED with a 1 day h/o hemoptysis. 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 [de-identified] : +Night sweats. Appetite decreased post chest procedure. No fevers. Has scheduled BMB.\par No increase in dyspnea. No CP. No c/o cough or hemoptysis. No abnormal bruising or bleeding.\par Continues to remain active, work.\par

## 2019-10-30 NOTE — ASSESSMENT
[FreeTextEntry1] : Patient with mediastinal lymphadenopathy, and peripheral blood lymphocytosis-peripheral blood flow cytometry consistent with a CD5 negative/CD10 negative lymphoproliferative disorder. Lymph node biopsy consistent with a CD5 positive/CD10 negative lymphoproliferative disorder- possible CLL/SLL or marginal zone lymphoma. Patient to have bone marrow biopsy-procedure with potential benefits/side effects explained. Pending BM results, can further consider peripheral blood CLL and marginal zone FISH panels.\par Discussed with patient and his wife the above, and potential treatment options. As he appears to have constitutional symptoms, would consider treatment following bone marrow evaluation. To consider course of Rituxan (potential side effects reviewed including but not limited to infusion reaction, infection risks), rather than Ibrutinib, for now.\par Discussed recommended smoking cessation.\par Patient and his wife were given the opportunity to ask questions. Their questions have been answered to their apparent satisfaction at this time. Patient to return to the office following bone marrow biopsy to finalize treatment plans.

## 2019-10-30 NOTE — PHYSICAL EXAM
[Normal] : affect appropriate [de-identified] : non-toxic appearing [de-identified] : supple, NT; shotty cervical LN's [de-identified] : CTA  [de-identified] : healed mediastinoscopy incision

## 2019-11-11 ENCOUNTER — OUTPATIENT (OUTPATIENT)
Dept: OUTPATIENT SERVICES | Facility: HOSPITAL | Age: 54
LOS: 1 days | End: 2019-11-11
Payer: COMMERCIAL

## 2019-11-11 DIAGNOSIS — R59.0 LOCALIZED ENLARGED LYMPH NODES: ICD-10-CM

## 2019-11-12 NOTE — HISTORY OF PRESENT ILLNESS
[FreeTextEntry1] : 54 year old M who was evaluated in the ED for hemoptysis in September. Work up included CT scan which revealed bilateral hilar and mediastinal lymphadenopathy. He was referred to Dr. Judy Sidhu for further evaluation.\par \par He is a current smoker. He has been smoking for 30 + years. 1 PPD. He is planning to quit smoking. He had pneumonia in 2006 and was hospitalized for that. History of Fracture of his Left foot last year and recurrent bronchitis. Last time he was treated for bronchitis was last year.\par \par PET CT on 9/29/2019:\par FDG avid mediastinal and bilateral hilar nodes. Reference nodes: -subcarinal lymph node, 2.5 x 1.8 cm (image 142), SUV 5.3. -paratracheal node, 2.0 x 2.1 cm (image 127), SUV 5.8. -difficult to delineate right hilar node SUV 5.3 (image 140).\par LUNGS: No abnormal FDG activity. No lung nodule. Bilateral pulmonary mosaic attenuation compatible with small airway/vessel disease.\par 1. FDG-avid base of tongue soft tissue. Please correlate with direct visualization and tissue sampling, if indicated. \par 2. Nonspecific mildly FDG-avid left level II node and bilateral level III cervical nodes. Subcentimeter bilateral levels I-V cervical nodes, too small to characterize. \par 3. FDG avid mildly enlarged mediastinal and bilateral hilar nodes.\par \par CT scan on 9/11/2019:\par Bilateral hilar and mediastinal lymphadenopathy. \par \par On 10/15/19, he underwent Mediastinoscopy and biopsy of right paratracheal lymph node. Pathology reveals CD5 positive B-cell lymphoproliferative disorder. The main differential diagnosis includes SLL/CLL and marginal zone lymphoma. \par \par Patient is followed by Dr Sidhu. Patient is pending bone marrow biopsy. \par

## 2019-11-13 ENCOUNTER — APPOINTMENT (OUTPATIENT)
Dept: THORACIC SURGERY | Facility: CLINIC | Age: 54
End: 2019-11-13

## 2019-11-13 ENCOUNTER — RESULT REVIEW (OUTPATIENT)
Age: 54
End: 2019-11-13

## 2019-11-13 ENCOUNTER — APPOINTMENT (OUTPATIENT)
Dept: HEMATOLOGY ONCOLOGY | Facility: CLINIC | Age: 54
End: 2019-11-13
Payer: COMMERCIAL

## 2019-11-13 VITALS
HEART RATE: 64 BPM | DIASTOLIC BLOOD PRESSURE: 82 MMHG | RESPIRATION RATE: 18 BRPM | TEMPERATURE: 98 F | BODY MASS INDEX: 35.77 KG/M2 | WEIGHT: 293.85 LBS | SYSTOLIC BLOOD PRESSURE: 127 MMHG | OXYGEN SATURATION: 97 %

## 2019-11-13 LAB
BASOPHILS # BLD AUTO: 0.1 K/UL — SIGNIFICANT CHANGE UP (ref 0–0.2)
BASOPHILS NFR BLD AUTO: 0.7 % — SIGNIFICANT CHANGE UP (ref 0–2)
EOSINOPHIL # BLD AUTO: 0.3 K/UL — SIGNIFICANT CHANGE UP (ref 0–0.5)
EOSINOPHIL NFR BLD AUTO: 2.2 % — SIGNIFICANT CHANGE UP (ref 0–6)
HCT VFR BLD CALC: 40 % — SIGNIFICANT CHANGE UP (ref 39–50)
HGB BLD-MCNC: 14.3 G/DL — SIGNIFICANT CHANGE UP (ref 13–17)
LYMPHOCYTES # BLD AUTO: 4.9 K/UL — HIGH (ref 1–3.3)
LYMPHOCYTES # BLD AUTO: 41 % — SIGNIFICANT CHANGE UP (ref 13–44)
MCHC RBC-ENTMCNC: 31.4 PG — SIGNIFICANT CHANGE UP (ref 27–34)
MCHC RBC-ENTMCNC: 35.8 G/DL — SIGNIFICANT CHANGE UP (ref 32–36)
MCV RBC AUTO: 87.5 FL — SIGNIFICANT CHANGE UP (ref 80–100)
MONOCYTES # BLD AUTO: 0.6 K/UL — SIGNIFICANT CHANGE UP (ref 0–0.9)
MONOCYTES NFR BLD AUTO: 5 % — SIGNIFICANT CHANGE UP (ref 2–14)
NEUTROPHILS # BLD AUTO: 6.1 K/UL — SIGNIFICANT CHANGE UP (ref 1.8–7.4)
NEUTROPHILS NFR BLD AUTO: 51 % — SIGNIFICANT CHANGE UP (ref 43–77)
PLATELET # BLD AUTO: 255 K/UL — SIGNIFICANT CHANGE UP (ref 150–400)
RBC # BLD: 4.57 M/UL — SIGNIFICANT CHANGE UP (ref 4.2–5.8)
RBC # FLD: 12.3 % — SIGNIFICANT CHANGE UP (ref 10.3–14.5)
WBC # BLD: 12 K/UL — HIGH (ref 3.8–10.5)
WBC # FLD AUTO: 12 K/UL — HIGH (ref 3.8–10.5)

## 2019-11-13 PROCEDURE — 88275 CYTOGENETICS 100-300: CPT

## 2019-11-13 PROCEDURE — 88271 CYTOGENETICS DNA PROBE: CPT

## 2019-11-13 PROCEDURE — 88342 IMHCHEM/IMCYTCHM 1ST ANTB: CPT

## 2019-11-13 PROCEDURE — 85097 BONE MARROW INTERPRETATION: CPT

## 2019-11-13 PROCEDURE — 88313 SPECIAL STAINS GROUP 2: CPT

## 2019-11-13 PROCEDURE — 88264 CHROMOSOME ANALYSIS 20-25: CPT

## 2019-11-13 PROCEDURE — 88313 SPECIAL STAINS GROUP 2: CPT | Mod: 26

## 2019-11-13 PROCEDURE — 88341 IMHCHEM/IMCYTCHM EA ADD ANTB: CPT

## 2019-11-13 PROCEDURE — 38222 DX BONE MARROW BX & ASPIR: CPT | Mod: RT

## 2019-11-13 PROCEDURE — 88189 FLOWCYTOMETRY/READ 16 & >: CPT

## 2019-11-13 PROCEDURE — 88305 TISSUE EXAM BY PATHOLOGIST: CPT

## 2019-11-13 PROCEDURE — 88184 FLOWCYTOMETRY/ TC 1 MARKER: CPT

## 2019-11-13 PROCEDURE — 88185 FLOWCYTOMETRY/TC ADD-ON: CPT

## 2019-11-13 PROCEDURE — 88237 TISSUE CULTURE BONE MARROW: CPT

## 2019-11-13 PROCEDURE — 88342 IMHCHEM/IMCYTCHM 1ST ANTB: CPT | Mod: 26,59

## 2019-11-13 PROCEDURE — 88305 TISSUE EXAM BY PATHOLOGIST: CPT | Mod: 26

## 2019-11-13 PROCEDURE — 88280 CHROMOSOME KARYOTYPE STUDY: CPT

## 2019-11-13 PROCEDURE — 87205 SMEAR GRAM STAIN: CPT

## 2019-11-13 PROCEDURE — 88285 CHROMOSOME COUNT ADDITIONAL: CPT

## 2019-11-13 PROCEDURE — 88291 CYTO/MOLECULAR REPORT: CPT

## 2019-11-13 PROCEDURE — 88341 IMHCHEM/IMCYTCHM EA ADD ANTB: CPT | Mod: 26

## 2019-11-13 NOTE — PROCEDURE
[Bone Marrow Biopsy] : bone marrow biopsy [Bone Marrow Aspiration] : bone marrow aspiration  [Patient identification verified] : patient identification verified [Patient] : the patient [Procedure verified and consent obtained] : procedure verified and consent obtained [Prone] : prone [Correct positioning] : correct positioning [Lidocaine was injected and into the periosteum overlying the site.] : Lidocaine was injected and into the periosteum overlying the site. [The right posterior iliac crest was prepped with betadine and draped, using sterile technique.] : The right posterior iliac crest was prepped with betadine and draped, using sterile technique. [Cytogenetics] : cytogenetics [Aspirate] : aspirate [FISH] : FISH [Biopsy] : biopsy [Flow Cytometry] : flow cytometry [] : The patient was instructed to remove the bandage the following AM. The patient may bathe. Acetaminophen may be taken for discomfort, as per package directions.If there are any other problems, the patient was instructed to call the office. The patient verbalized understanding, and is aware of the office contact numbers. [FreeTextEntry1] : NHL: marginal zone v.s CLL/SLL staging BM Bx  [FreeTextEntry2] : CBC prior to procedure:\par WBC 12\par HGB 14.3     HCT 40\par PLTS 255\par Procedure performed by VASILIY Montemayor under guidance of LYNDON Middleton, snaring Jamshidi needle utilized successfully.\par \par

## 2019-11-13 NOTE — REASON FOR VISIT
[Bone Marrow Biopsy] : bone marrow biopsy [Bone Marrow Aspiration] : bone marrow aspiration [Spouse] : spouse [FreeTextEntry2] : NHL: marginal zone v.s CLL/SLL staging BM Bx

## 2019-11-14 ENCOUNTER — RESULT REVIEW (OUTPATIENT)
Age: 54
End: 2019-11-14

## 2019-11-18 LAB
HEMATOPATHOLOGY REPORT: SIGNIFICANT CHANGE UP
TM INTERPRETATION: SIGNIFICANT CHANGE UP

## 2019-11-19 ENCOUNTER — OUTPATIENT (OUTPATIENT)
Dept: OUTPATIENT SERVICES | Facility: HOSPITAL | Age: 54
LOS: 1 days | Discharge: HOME | End: 2019-11-19

## 2019-11-19 DIAGNOSIS — R59.0 LOCALIZED ENLARGED LYMPH NODES: ICD-10-CM

## 2019-11-20 LAB — CHROM ANALY INTERPHASE BLD FISH-IMP: SIGNIFICANT CHANGE UP

## 2019-11-25 ENCOUNTER — RESULT REVIEW (OUTPATIENT)
Age: 54
End: 2019-11-25

## 2019-11-25 ENCOUNTER — APPOINTMENT (OUTPATIENT)
Dept: HEMATOLOGY ONCOLOGY | Facility: CLINIC | Age: 54
End: 2019-11-25
Payer: COMMERCIAL

## 2019-11-25 VITALS
DIASTOLIC BLOOD PRESSURE: 83 MMHG | OXYGEN SATURATION: 96 % | BODY MASS INDEX: 35.56 KG/M2 | WEIGHT: 292.11 LBS | SYSTOLIC BLOOD PRESSURE: 136 MMHG | RESPIRATION RATE: 18 BRPM | TEMPERATURE: 98.4 F | HEART RATE: 71 BPM

## 2019-11-25 LAB
BASOPHILS # BLD AUTO: 0.1 K/UL — SIGNIFICANT CHANGE UP (ref 0–0.2)
BASOPHILS NFR BLD AUTO: 0.6 % — SIGNIFICANT CHANGE UP (ref 0–2)
EOSINOPHIL # BLD AUTO: 0.1 K/UL — SIGNIFICANT CHANGE UP (ref 0–0.5)
EOSINOPHIL NFR BLD AUTO: 0.8 % — SIGNIFICANT CHANGE UP (ref 0–6)
HCT VFR BLD CALC: 42.7 % — SIGNIFICANT CHANGE UP (ref 39–50)
HGB BLD-MCNC: 14.3 G/DL — SIGNIFICANT CHANGE UP (ref 13–17)
LYMPHOCYTES # BLD AUTO: 35.5 % — SIGNIFICANT CHANGE UP (ref 13–44)
LYMPHOCYTES # BLD AUTO: 5.3 K/UL — HIGH (ref 1–3.3)
MCHC RBC-ENTMCNC: 29.1 PG — SIGNIFICANT CHANGE UP (ref 27–34)
MCHC RBC-ENTMCNC: 33.4 G/DL — SIGNIFICANT CHANGE UP (ref 32–36)
MCV RBC AUTO: 87.1 FL — SIGNIFICANT CHANGE UP (ref 80–100)
MONOCYTES # BLD AUTO: 0.5 K/UL — SIGNIFICANT CHANGE UP (ref 0–0.9)
MONOCYTES NFR BLD AUTO: 3.4 % — SIGNIFICANT CHANGE UP (ref 2–14)
NEUTROPHILS # BLD AUTO: 9 K/UL — HIGH (ref 1.8–7.4)
NEUTROPHILS NFR BLD AUTO: 59.7 % — SIGNIFICANT CHANGE UP (ref 43–77)
PLATELET # BLD AUTO: 285 K/UL — SIGNIFICANT CHANGE UP (ref 150–400)
RBC # BLD: 4.9 M/UL — SIGNIFICANT CHANGE UP (ref 4.2–5.8)
RBC # FLD: 12.1 % — SIGNIFICANT CHANGE UP (ref 10.3–14.5)
WBC # BLD: 15 K/UL — HIGH (ref 3.8–10.5)
WBC # FLD AUTO: 15 K/UL — HIGH (ref 3.8–10.5)

## 2019-11-25 PROCEDURE — 99214 OFFICE O/P EST MOD 30 MIN: CPT

## 2019-11-25 NOTE — HISTORY OF PRESENT ILLNESS
[de-identified] : 9/2019-Presented to the ED with a 1 day h/o hemoptysis. 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 Bone marrow biopsy, and bone marrow aspirate showed trilineage hematopoiesis with increased iron stores. Flow cytometry showed CD5 negative monotypic B cells with no significant infiltrate in the bone marrow biopsy. Suspected atypical marginal zone lymphoma or CLL, when considering lymph node pathology, as well. [de-identified] : S/P BMB. +Fatigue.+Night sweats. +Intermittent cough. +Anxiety.\par Plans to go on temporary disability from work.\par

## 2019-11-25 NOTE — PHYSICAL EXAM
[Normal] : affect appropriate [de-identified] : non-toxic appearing [de-identified] : CTA  [de-identified] : supple, NT; shotty cervical LN's

## 2019-11-25 NOTE — ASSESSMENT
[FreeTextEntry1] : Patient with lymphoproliferative disorder with mediastinal lymphadenopathy. Mediastinal lymph node pathology showed partial involvement of a lymph node with a CD5 positive, B-cell lymphoproliferative disorder. Peripheral blood flow cytometry, and bone marrow with CD5 negative monotypic B cells. Discussed differential diagnosis with patient and his wife. Clinically, suggestive of a low grade, non-Hodgkin's lymphoma-possibly atypical marginal zone or CLL. Discussed management options-in light of constitutional symptoms, favor a trial of Rituxan monotherapy-potential side effects explained including but not limited to, infusion reaction/hypersensitivity reaction, cardiopulmonary toxicity, infection risks. Patient consents to Rituxan-verbal and written consent obtained.\par

## 2019-11-25 NOTE — CONSULT LETTER
[Courtesy Letter:] : I had the pleasure of seeing your patient, [unfilled], in my office today. [Dear  ___] : Dear  [unfilled], [Please see my note below.] : Please see my note below. [Consult Closing:] : Thank you very much for allowing me to participate in the care of this patient.  If you have any questions, please do not hesitate to contact me. [Sincerely,] : Sincerely, [FreeTextEntry3] : Judy Gilbert M.D.

## 2019-11-25 NOTE — REVIEW OF SYSTEMS
[Fatigue] : fatigue [Negative] : Allergic/Immunologic [Anxiety] : anxiety [Shortness Of Breath] : no shortness of breath [Easy Bruising] : no tendency for easy bruising

## 2019-11-26 LAB
ALBUMIN SERPL ELPH-MCNC: 4.3 G/DL
ALP BLD-CCNC: 70 U/L
ALT SERPL-CCNC: 9 U/L
ANION GAP SERPL CALC-SCNC: 14 MMOL/L
AST SERPL-CCNC: 15 U/L
BILIRUB SERPL-MCNC: 0.2 MG/DL
BUN SERPL-MCNC: 7 MG/DL
CALCIUM SERPL-MCNC: 9.2 MG/DL
CHLORIDE SERPL-SCNC: 96 MMOL/L
CHROM ANALY OVERALL INTERP SPEC-IMP: SIGNIFICANT CHANGE UP
CO2 SERPL-SCNC: 28 MMOL/L
CREAT SERPL-MCNC: 0.62 MG/DL
GLUCOSE SERPL-MCNC: 90 MG/DL
HAV IGM SER QL: NONREACTIVE
HBV CORE IGM SER QL: NONREACTIVE
HBV SURFACE AG SER QL: NONREACTIVE
HCV AB SER QL: NONREACTIVE
HCV S/CO RATIO: 0.16 S/CO
POTASSIUM SERPL-SCNC: 3.9 MMOL/L
PROT SERPL-MCNC: 6.5 G/DL
SODIUM SERPL-SCNC: 137 MMOL/L

## 2019-12-05 ENCOUNTER — APPOINTMENT (OUTPATIENT)
Dept: INFUSION THERAPY | Facility: HOSPITAL | Age: 54
End: 2019-12-05
Payer: COMMERCIAL

## 2019-12-05 ENCOUNTER — RESULT REVIEW (OUTPATIENT)
Age: 54
End: 2019-12-05

## 2019-12-05 LAB
BASOPHILS # BLD AUTO: 0.2 K/UL — SIGNIFICANT CHANGE UP (ref 0–0.2)
BASOPHILS NFR BLD AUTO: 1.3 % — SIGNIFICANT CHANGE UP (ref 0–2)
EOSINOPHIL # BLD AUTO: 0.2 K/UL — SIGNIFICANT CHANGE UP (ref 0–0.5)
EOSINOPHIL NFR BLD AUTO: 1.6 % — SIGNIFICANT CHANGE UP (ref 0–6)
HCT VFR BLD CALC: 44.6 % — SIGNIFICANT CHANGE UP (ref 39–50)
HGB BLD-MCNC: 15.6 G/DL — SIGNIFICANT CHANGE UP (ref 13–17)
LYMPHOCYTES # BLD AUTO: 33.6 % — SIGNIFICANT CHANGE UP (ref 13–44)
LYMPHOCYTES # BLD AUTO: 4.9 K/UL — HIGH (ref 1–3.3)
MCHC RBC-ENTMCNC: 30.2 PG — SIGNIFICANT CHANGE UP (ref 27–34)
MCHC RBC-ENTMCNC: 35 G/DL — SIGNIFICANT CHANGE UP (ref 32–36)
MCV RBC AUTO: 86.2 FL — SIGNIFICANT CHANGE UP (ref 80–100)
MONOCYTES # BLD AUTO: 0.6 K/UL — SIGNIFICANT CHANGE UP (ref 0–0.9)
MONOCYTES NFR BLD AUTO: 4 % — SIGNIFICANT CHANGE UP (ref 2–14)
NEUTROPHILS # BLD AUTO: 8.7 K/UL — HIGH (ref 1.8–7.4)
NEUTROPHILS NFR BLD AUTO: 59.6 % — SIGNIFICANT CHANGE UP (ref 43–77)
PLATELET # BLD AUTO: 277 K/UL — SIGNIFICANT CHANGE UP (ref 150–400)
RBC # BLD: 5.17 M/UL — SIGNIFICANT CHANGE UP (ref 4.2–5.8)
RBC # FLD: 12 % — SIGNIFICANT CHANGE UP (ref 10.3–14.5)
WBC # BLD: 14.6 K/UL — HIGH (ref 3.8–10.5)
WBC # FLD AUTO: 14.6 K/UL — HIGH (ref 3.8–10.5)

## 2019-12-05 PROCEDURE — 99213 OFFICE O/P EST LOW 20 MIN: CPT

## 2019-12-06 DIAGNOSIS — D47.9 NEOPLASM OF UNCERTAIN BEHAVIOR OF LYMPHOID, HEMATOPOIETIC AND RELATED TISSUE, UNSPECIFIED: ICD-10-CM

## 2019-12-06 DIAGNOSIS — Z51.11 ENCOUNTER FOR ANTINEOPLASTIC CHEMOTHERAPY: ICD-10-CM

## 2019-12-06 DIAGNOSIS — R11.2 NAUSEA WITH VOMITING, UNSPECIFIED: ICD-10-CM

## 2019-12-12 ENCOUNTER — OTHER (OUTPATIENT)
Age: 54
End: 2019-12-12

## 2019-12-12 ENCOUNTER — RESULT REVIEW (OUTPATIENT)
Age: 54
End: 2019-12-12

## 2019-12-12 ENCOUNTER — APPOINTMENT (OUTPATIENT)
Dept: INFUSION THERAPY | Facility: HOSPITAL | Age: 54
End: 2019-12-12

## 2019-12-12 DIAGNOSIS — Z91.89 OTHER SPECIFIED PERSONAL RISK FACTORS, NOT ELSEWHERE CLASSIFIED: ICD-10-CM

## 2019-12-12 LAB
BASOPHILS # BLD AUTO: 0.1 K/UL — SIGNIFICANT CHANGE UP (ref 0–0.2)
BASOPHILS NFR BLD AUTO: 0.5 % — SIGNIFICANT CHANGE UP (ref 0–2)
EOSINOPHIL # BLD AUTO: 0.4 K/UL — SIGNIFICANT CHANGE UP (ref 0–0.5)
EOSINOPHIL NFR BLD AUTO: 3.1 % — SIGNIFICANT CHANGE UP (ref 0–6)
HCT VFR BLD CALC: 41.1 % — SIGNIFICANT CHANGE UP (ref 39–50)
HGB BLD-MCNC: 14.1 G/DL — SIGNIFICANT CHANGE UP (ref 13–17)
LYMPHOCYTES # BLD AUTO: 1.9 K/UL — SIGNIFICANT CHANGE UP (ref 1–3.3)
LYMPHOCYTES # BLD AUTO: 15.3 % — SIGNIFICANT CHANGE UP (ref 13–44)
MCHC RBC-ENTMCNC: 29.4 PG — SIGNIFICANT CHANGE UP (ref 27–34)
MCHC RBC-ENTMCNC: 34.3 G/DL — SIGNIFICANT CHANGE UP (ref 32–36)
MCV RBC AUTO: 85.7 FL — SIGNIFICANT CHANGE UP (ref 80–100)
MONOCYTES # BLD AUTO: 0.5 K/UL — SIGNIFICANT CHANGE UP (ref 0–0.9)
MONOCYTES NFR BLD AUTO: 4.4 % — SIGNIFICANT CHANGE UP (ref 2–14)
NEUTROPHILS # BLD AUTO: 9.4 K/UL — HIGH (ref 1.8–7.4)
NEUTROPHILS NFR BLD AUTO: 76.8 % — SIGNIFICANT CHANGE UP (ref 43–77)
PLATELET # BLD AUTO: 299 K/UL — SIGNIFICANT CHANGE UP (ref 150–400)
RBC # BLD: 4.79 M/UL — SIGNIFICANT CHANGE UP (ref 4.2–5.8)
RBC # FLD: 12.1 % — SIGNIFICANT CHANGE UP (ref 10.3–14.5)
WBC # BLD: 12.3 K/UL — HIGH (ref 3.8–10.5)
WBC # FLD AUTO: 12.3 K/UL — HIGH (ref 3.8–10.5)

## 2019-12-12 RX ORDER — ONDANSETRON 8 MG/1
8 TABLET ORAL
Qty: 30 | Refills: 0 | Status: ACTIVE | COMMUNITY
Start: 2019-12-12 | End: 1900-01-01

## 2019-12-19 ENCOUNTER — RESULT REVIEW (OUTPATIENT)
Age: 54
End: 2019-12-19

## 2019-12-19 ENCOUNTER — APPOINTMENT (OUTPATIENT)
Dept: INFUSION THERAPY | Facility: HOSPITAL | Age: 54
End: 2019-12-19

## 2019-12-19 LAB
BASOPHILS # BLD AUTO: 0.1 K/UL — SIGNIFICANT CHANGE UP (ref 0–0.2)
BASOPHILS NFR BLD AUTO: 0.7 % — SIGNIFICANT CHANGE UP (ref 0–2)
EOSINOPHIL # BLD AUTO: 0.4 K/UL — SIGNIFICANT CHANGE UP (ref 0–0.5)
EOSINOPHIL NFR BLD AUTO: 3.5 % — SIGNIFICANT CHANGE UP (ref 0–6)
HCT VFR BLD CALC: 42.6 % — SIGNIFICANT CHANGE UP (ref 39–50)
HGB BLD-MCNC: 14.2 G/DL — SIGNIFICANT CHANGE UP (ref 13–17)
LYMPHOCYTES # BLD AUTO: 1.8 K/UL — SIGNIFICANT CHANGE UP (ref 1–3.3)
LYMPHOCYTES # BLD AUTO: 16.1 % — SIGNIFICANT CHANGE UP (ref 13–44)
MCHC RBC-ENTMCNC: 28.6 PG — SIGNIFICANT CHANGE UP (ref 27–34)
MCHC RBC-ENTMCNC: 33.3 G/DL — SIGNIFICANT CHANGE UP (ref 32–36)
MCV RBC AUTO: 85.8 FL — SIGNIFICANT CHANGE UP (ref 80–100)
MONOCYTES # BLD AUTO: 0.5 K/UL — SIGNIFICANT CHANGE UP (ref 0–0.9)
MONOCYTES NFR BLD AUTO: 4.7 % — SIGNIFICANT CHANGE UP (ref 2–14)
NEUTROPHILS # BLD AUTO: 8.3 K/UL — HIGH (ref 1.8–7.4)
NEUTROPHILS NFR BLD AUTO: 75 % — SIGNIFICANT CHANGE UP (ref 43–77)
PLATELET # BLD AUTO: 295 K/UL — SIGNIFICANT CHANGE UP (ref 150–400)
RBC # BLD: 4.96 M/UL — SIGNIFICANT CHANGE UP (ref 4.2–5.8)
RBC # FLD: 12.3 % — SIGNIFICANT CHANGE UP (ref 10.3–14.5)
WBC # BLD: 11 K/UL — HIGH (ref 3.8–10.5)
WBC # FLD AUTO: 11 K/UL — HIGH (ref 3.8–10.5)

## 2019-12-20 ENCOUNTER — OUTPATIENT (OUTPATIENT)
Dept: OUTPATIENT SERVICES | Facility: HOSPITAL | Age: 54
LOS: 1 days | Discharge: HOME | End: 2019-12-20

## 2019-12-20 DIAGNOSIS — R59.0 LOCALIZED ENLARGED LYMPH NODES: ICD-10-CM

## 2019-12-26 ENCOUNTER — RESULT REVIEW (OUTPATIENT)
Age: 54
End: 2019-12-26

## 2019-12-26 ENCOUNTER — LABORATORY RESULT (OUTPATIENT)
Age: 54
End: 2019-12-26

## 2019-12-26 ENCOUNTER — APPOINTMENT (OUTPATIENT)
Dept: INFUSION THERAPY | Facility: HOSPITAL | Age: 54
End: 2019-12-26

## 2019-12-26 LAB
BASOPHILS # BLD AUTO: 0.1 K/UL — SIGNIFICANT CHANGE UP (ref 0–0.2)
BASOPHILS NFR BLD AUTO: 0.7 % — SIGNIFICANT CHANGE UP (ref 0–2)
EOSINOPHIL # BLD AUTO: 0 K/UL — SIGNIFICANT CHANGE UP (ref 0–0.5)
EOSINOPHIL NFR BLD AUTO: 0 % — SIGNIFICANT CHANGE UP (ref 0–6)
HCT VFR BLD CALC: 44.7 % — SIGNIFICANT CHANGE UP (ref 39–50)
HGB BLD-MCNC: 15.5 G/DL — SIGNIFICANT CHANGE UP (ref 13–17)
LYMPHOCYTES # BLD AUTO: 2.2 K/UL — SIGNIFICANT CHANGE UP (ref 1–3.3)
LYMPHOCYTES # BLD AUTO: 22 % — SIGNIFICANT CHANGE UP (ref 13–44)
MCHC RBC-ENTMCNC: 29.2 PG — SIGNIFICANT CHANGE UP (ref 27–34)
MCHC RBC-ENTMCNC: 34.6 G/DL — SIGNIFICANT CHANGE UP (ref 32–36)
MCV RBC AUTO: 84.4 FL — SIGNIFICANT CHANGE UP (ref 80–100)
MONOCYTES # BLD AUTO: 0.7 K/UL — SIGNIFICANT CHANGE UP (ref 0–0.9)
MONOCYTES NFR BLD AUTO: 6.5 % — SIGNIFICANT CHANGE UP (ref 2–14)
NEUTROPHILS # BLD AUTO: 7.2 K/UL — SIGNIFICANT CHANGE UP (ref 1.8–7.4)
NEUTROPHILS NFR BLD AUTO: 70.8 % — SIGNIFICANT CHANGE UP (ref 43–77)
PLATELET # BLD AUTO: 279 K/UL — SIGNIFICANT CHANGE UP (ref 150–400)
RBC # BLD: 5.3 M/UL — SIGNIFICANT CHANGE UP (ref 4.2–5.8)
RBC # FLD: 12.3 % — SIGNIFICANT CHANGE UP (ref 10.3–14.5)
WBC # BLD: 10.1 K/UL — SIGNIFICANT CHANGE UP (ref 3.8–10.5)
WBC # FLD AUTO: 10.1 K/UL — SIGNIFICANT CHANGE UP (ref 3.8–10.5)

## 2019-12-27 ENCOUNTER — LABORATORY RESULT (OUTPATIENT)
Age: 54
End: 2019-12-27

## 2019-12-27 ENCOUNTER — MESSAGE (OUTPATIENT)
Age: 54
End: 2019-12-27

## 2019-12-27 DIAGNOSIS — Z51.11 ENCOUNTER FOR ANTINEOPLASTIC CHEMOTHERAPY: ICD-10-CM

## 2019-12-27 DIAGNOSIS — R11.2 NAUSEA WITH VOMITING, UNSPECIFIED: ICD-10-CM

## 2019-12-27 DIAGNOSIS — E87.5 HYPERKALEMIA: ICD-10-CM

## 2019-12-27 DIAGNOSIS — D47.9 NEOPLASM OF UNCERTAIN BEHAVIOR OF LYMPHOID, HEMATOPOIETIC AND RELATED TISSUE, UNSPECIFIED: ICD-10-CM

## 2020-01-13 ENCOUNTER — APPOINTMENT (OUTPATIENT)
Dept: HEMATOLOGY ONCOLOGY | Facility: CLINIC | Age: 55
End: 2020-01-13
Payer: COMMERCIAL

## 2020-01-13 VITALS
OXYGEN SATURATION: 96 % | SYSTOLIC BLOOD PRESSURE: 132 MMHG | RESPIRATION RATE: 18 BRPM | DIASTOLIC BLOOD PRESSURE: 85 MMHG | WEIGHT: 298.5 LBS | BODY MASS INDEX: 36.33 KG/M2 | HEART RATE: 70 BPM | TEMPERATURE: 97.7 F

## 2020-01-13 PROCEDURE — 99214 OFFICE O/P EST MOD 30 MIN: CPT

## 2020-01-13 NOTE — PHYSICAL EXAM
[Normal] : affect appropriate [de-identified] : supple, NT; unable to appreciate LAD [de-identified] : CTA  [de-identified] : non-toxic appearing

## 2020-01-13 NOTE — HISTORY OF PRESENT ILLNESS
[de-identified] : 9/2019-Presented to the ED with a 1 day h/o hemoptysis. 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 Bone marrow biopsy, and bone marrow aspirate showed trilineage hematopoiesis with increased iron stores. Flow cytometry showed CD5 negative monotypic B cells with no significant infiltrate in the bone marrow biopsy. Suspected atypical marginal zone lymphoma or CLL, when considering lymph node pathology, as well.\par Due to constitutional symptoms/night sweats-->Rituxan weekly x 4-completed 12/26/19. [de-identified] : S/P Rituxan. Overall, feeling better with decrease in night sweats since second/third week of Rituxan.\par Cough better. Plans to go back to work 1/20/20. No c/o SOB or fevers.\par \par

## 2020-01-13 NOTE — ASSESSMENT
[FreeTextEntry1] : Patient with lymphoproliferative disorder with mediastinal lymphadenopathy. Mediastinal lymph node pathology showed partial involvement of a lymph node with a CD5 positive, B-cell lymphoproliferative disorder. Peripheral blood flow cytometry, and bone marrow with CD5 negative monotypic B cells. Clinically, suggestive of a low grade, non-Hodgkin's lymphoma-possibly atypical marginal zone or CLL. \par Clinically stable status post 4 weekly doses of Rituxan. Interval follow up PET/CT scan ordered.\par Reviewed potential complications of lymphoproliferative disorder/potential indications for further treatment going forward.\par Patient and his wife were given the opportunity to ask questions. Their questions Have been answered to their apparent satisfaction at this time.

## 2020-02-20 ENCOUNTER — FORM ENCOUNTER (OUTPATIENT)
Age: 55
End: 2020-02-20

## 2020-02-21 ENCOUNTER — OUTPATIENT (OUTPATIENT)
Dept: OUTPATIENT SERVICES | Facility: HOSPITAL | Age: 55
LOS: 1 days | End: 2020-02-21
Payer: COMMERCIAL

## 2020-02-21 ENCOUNTER — APPOINTMENT (OUTPATIENT)
Dept: NUCLEAR MEDICINE | Facility: CLINIC | Age: 55
End: 2020-02-21
Payer: COMMERCIAL

## 2020-02-21 DIAGNOSIS — Z00.8 ENCOUNTER FOR OTHER GENERAL EXAMINATION: ICD-10-CM

## 2020-02-21 PROCEDURE — A9552: CPT

## 2020-02-21 PROCEDURE — 78816 PET IMAGE W/CT FULL BODY: CPT | Mod: 26,PS

## 2020-02-21 PROCEDURE — 78816 PET IMAGE W/CT FULL BODY: CPT

## 2020-02-22 ENCOUNTER — OUTPATIENT (OUTPATIENT)
Dept: OUTPATIENT SERVICES | Facility: HOSPITAL | Age: 55
LOS: 1 days | Discharge: HOME | End: 2020-02-22

## 2020-02-22 DIAGNOSIS — R59.1 GENERALIZED ENLARGED LYMPH NODES: ICD-10-CM

## 2020-02-26 ENCOUNTER — APPOINTMENT (OUTPATIENT)
Dept: HEMATOLOGY ONCOLOGY | Facility: CLINIC | Age: 55
End: 2020-02-26
Payer: COMMERCIAL

## 2020-02-26 VITALS
RESPIRATION RATE: 14 BRPM | OXYGEN SATURATION: 95 % | TEMPERATURE: 98.6 F | DIASTOLIC BLOOD PRESSURE: 83 MMHG | HEART RATE: 66 BPM | WEIGHT: 304.22 LBS | SYSTOLIC BLOOD PRESSURE: 147 MMHG | BODY MASS INDEX: 37.03 KG/M2

## 2020-02-26 PROCEDURE — 99213 OFFICE O/P EST LOW 20 MIN: CPT

## 2020-02-26 NOTE — RESULTS/DATA
[FreeTextEntry1] : PET/CT scan-since 9/2019-1. Minimally increased FDG avidity with slightly decreased size of prevascular lymph node with FDG \par avidity greater than hepatic background (Deauville 4). \par 2. Decreased FDG avidity of mediastinal and hilar lymph nodes now with FDG avidity equal to \par mediastinal background (Deauville 2). Decreased base of tongue soft tissue and decreased size of \par left level 2 cervical lymph node. \par 3. No new FDG avid disease.

## 2020-02-26 NOTE — ASSESSMENT
[FreeTextEntry1] : Patient with lymphoproliferative disorder with mediastinal lymphadenopathy. Mediastinal lymph node pathology showed partial involvement of a lymph node with a CD5 positive, B-cell lymphoproliferative disorder. Peripheral blood flow cytometry, and bone marrow with CD5 negative monotypic B cells. Clinically, suggestive of a low grade, non-Hodgkin's lymphoma-possibly atypical marginal zone or CLL.\par Status post course of weekly Rituxan x4. Reviewed PET/CT scan results with patient and his wife in detail. Overall, appears to have  improvement of disease. Slight increase in FDG avidity of prevascular lymph node (with slight decrease in size) of unclear significance currently. No new FDG avid disease noted. Short interval followup imaging recommended.\par Patient's his wife's questions have been answered to their apparent satisfaction. Patient expressed his understanding and willingness to comply with recommended followup. He was advised to call if any increased symptoms/concerns prior to next visit.

## 2020-02-26 NOTE — PHYSICAL EXAM
[Normal] : affect appropriate [Fully active, able to carry on all pre-disease performance without restriction] : Status 0 - Fully active, able to carry on all pre-disease performance without restriction [de-identified] : non-toxic appearing [de-identified] : supple, NT; unable to appreciate LAD [de-identified] : CTA

## 2020-02-26 NOTE — HISTORY OF PRESENT ILLNESS
[de-identified] : 9/2019-Presented to the ED with a 1 day h/o hemoptysis. 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 Bone marrow biopsy, and bone marrow aspirate showed trilineage hematopoiesis with increased iron stores. Flow cytometry showed CD5 negative monotypic B cells with no significant infiltrate in the bone marrow biopsy. Suspected atypical marginal zone lymphoma or CLL, when considering lymph node pathology, as well.\par Due to constitutional symptoms/night sweats-->Rituxan weekly x 4-completed 12/26/19. [de-identified] : Gets occasional night sweats still-different parts of body at different times (example, shoulders, or ant. chest)-not every night. No associated fevers. No new LN complaints.\par Back to working 2 jobs.\par S/P URI 2 weeks ago-lasted approx. 1 week. +Had cough productive of clear phlegm. PCP placed patient on Augmentin with subsequent resolution of symptoms.No c/o CP, SOB.\par \par \par \par

## 2020-02-29 NOTE — ASU PATIENT PROFILE, ADULT - NUMBER OF YRS
29 evaluated for coughing likely 2/2 to asthma/bronchospasms, treated with nebs and steroids and symtpomst resolved. no signs of ptx on cxr or pna.

## 2020-05-27 ENCOUNTER — OUTPATIENT (OUTPATIENT)
Dept: OUTPATIENT SERVICES | Facility: HOSPITAL | Age: 55
LOS: 1 days | Discharge: HOME | End: 2020-05-27

## 2020-05-27 DIAGNOSIS — R59.0 LOCALIZED ENLARGED LYMPH NODES: ICD-10-CM

## 2020-06-05 ENCOUNTER — OUTPATIENT (OUTPATIENT)
Dept: OUTPATIENT SERVICES | Facility: HOSPITAL | Age: 55
LOS: 1 days | End: 2020-06-05
Payer: COMMERCIAL

## 2020-06-05 ENCOUNTER — RESULT REVIEW (OUTPATIENT)
Age: 55
End: 2020-06-05

## 2020-06-05 ENCOUNTER — APPOINTMENT (OUTPATIENT)
Dept: CT IMAGING | Facility: CLINIC | Age: 55
End: 2020-06-05
Payer: COMMERCIAL

## 2020-06-05 DIAGNOSIS — R59.0 LOCALIZED ENLARGED LYMPH NODES: ICD-10-CM

## 2020-06-05 DIAGNOSIS — D47.9 NEOPLASM OF UNCERTAIN BEHAVIOR OF LYMPHOID, HEMATOPOIETIC AND RELATED TISSUE, UNSPECIFIED: ICD-10-CM

## 2020-06-05 PROCEDURE — 71260 CT THORAX DX C+: CPT | Mod: 26

## 2020-06-05 PROCEDURE — 71260 CT THORAX DX C+: CPT

## 2020-06-18 ENCOUNTER — APPOINTMENT (OUTPATIENT)
Dept: HEMATOLOGY ONCOLOGY | Facility: CLINIC | Age: 55
End: 2020-06-18
Payer: COMMERCIAL

## 2020-06-18 PROCEDURE — 99214 OFFICE O/P EST MOD 30 MIN: CPT | Mod: 95

## 2020-06-18 NOTE — REASON FOR VISIT
[Medical Office: ([facility])___] : at the medical office located in  [Other Location: e.g. School (Enter Location, City,State)___] : at [unfilled], at the time of the visit. [Other:____] : [unfilled] [Verbal consent obtained from patient] : the patient, [unfilled] [Follow-Up Visit] : a follow-up [Other: _____] : [unfilled] [FreeTextEntry2] : NHL

## 2020-06-18 NOTE — PHYSICAL EXAM
[Fully active, able to carry on all pre-disease performance without restriction] : Status 0 - Fully active, able to carry on all pre-disease performance without restriction [Normal] : affect appropriate [de-identified] : breathing appeared unlabored [de-identified] : non-toxic appearing [de-identified] : coloration appeared normal

## 2020-06-18 NOTE — HISTORY OF PRESENT ILLNESS
[de-identified] : 9/2019-Presented to the ED with a 1 day h/o hemoptysis. 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 Bone marrow biopsy, and bone marrow aspirate showed trilineage hematopoiesis with increased iron stores. Flow cytometry showed CD5 negative monotypic B cells with no significant infiltrate in the bone marrow biopsy. Suspected atypical marginal zone lymphoma or CLL, when considering lymph node pathology, as well.\par Due to constitutional symptoms/night sweats-->Rituxan weekly x 4-completed 12/26/19. [de-identified] : Telehealth visit due to COVID pandemic.\par Gets occasional mild night sweats–collar, hair.  No fevers.  No weight loss.  No new lymph node complaints.  No complaints of cough, shortness of breath.\par No GI complaints.  No complaints of bone pain.\par Is working, active, though gets fatigued at times.\par \par \par \par

## 2020-06-18 NOTE — ASSESSMENT
[FreeTextEntry1] : Patient with lymphoproliferative disorder with mediastinal lymphadenopathy. Mediastinal lymph node pathology showed partial involvement of a lymph node with a CD5 positive, B-cell lymphoproliferative disorder. Peripheral blood flow cytometry, and bone marrow with CD5 negative monotypic B cells. Clinically, suggestive of a low grade, non-Hodgkin's lymphoma-possibly atypical marginal zone or CLL.\par Status post course of weekly Rituxan x4.\par Reviewed CT chest results with patient and his sister in detail.  No new disease reported.  Favor continued hematologic surveillance, considering risk versus benefits of treatment at this juncture.\par Patient and his sister were given the opportunity to ask questions.  Their questions have been answered to their apparent satisfaction at this time.  They concur with plans of care.  Patient is agreeable to recommended follow-up.\par

## 2020-06-18 NOTE — CONSULT LETTER
[Courtesy Letter:] : I had the pleasure of seeing your patient, [unfilled], in my office today. [Dear  ___] : Dear  [unfilled], [Consult Closing:] : Thank you very much for allowing me to participate in the care of this patient.  If you have any questions, please do not hesitate to contact me. [Please see my note below.] : Please see my note below. [Sincerely,] : Sincerely, [FreeTextEntry3] : Judy Gilbert MD.

## 2020-06-18 NOTE — RESULTS/DATA
[FreeTextEntry1] : Hemoglobin 15.5, hematocrit 44.9, WBC 11.6 with 74% neutrophils, 21% lymphocytes, platelet count 341,000.  CMP within normal limits, .\par CT chest–compared to 9/2019, interval decrease of mediastinal and hilar lymph nodes.  Emphysema.

## 2020-09-12 ENCOUNTER — OUTPATIENT (OUTPATIENT)
Dept: OUTPATIENT SERVICES | Facility: HOSPITAL | Age: 55
LOS: 1 days | Discharge: HOME | End: 2020-09-12

## 2020-09-12 DIAGNOSIS — R59.0 LOCALIZED ENLARGED LYMPH NODES: ICD-10-CM

## 2020-10-07 ENCOUNTER — APPOINTMENT (OUTPATIENT)
Dept: HEMATOLOGY ONCOLOGY | Facility: CLINIC | Age: 55
End: 2020-10-07
Payer: COMMERCIAL

## 2020-10-07 PROCEDURE — 99213 OFFICE O/P EST LOW 20 MIN: CPT | Mod: 95

## 2020-10-07 NOTE — RESULTS/DATA
[FreeTextEntry1] : 10/3/2020–calcium 9.1, total bilirubin 0.4, alkaline phosphatase 89, AST 16, ALT 8, hemoglobin 13.4, hematocrit 40.1, WBC 9.3 with 68% neutrophils, 23% lymphocytes, platelet count 331,000.

## 2020-10-07 NOTE — REASON FOR VISIT
[Home] : at home, [unfilled] , at the time of the visit. [Medical Office: ([facility])___] : at the medical office located in  [Verbal consent obtained from patient] : the patient, [unfilled] [Follow-Up Visit] : a follow-up [FreeTextEntry2] : NHL

## 2020-10-07 NOTE — HISTORY OF PRESENT ILLNESS
[de-identified] : 9/2019-Presented to the ED with a 1 day h/o hemoptysis. 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 Bone marrow biopsy, and bone marrow aspirate showed trilineage hematopoiesis with increased iron stores. Flow cytometry showed CD5 negative monotypic B cells with no significant infiltrate in the bone marrow biopsy. Suspected atypical marginal zone lymphoma or CLL, when considering lymph node pathology, as well.\par Due to constitutional symptoms/night sweats-->Rituxan weekly x 4-completed 12/26/19. [de-identified] : Telehealth visit due to COVID pandemic.\par Saw Dr. Pearce in primary care f/u-told to start vitamin D supplement for a low level.\par Is working. Has had some decreased energy, though maintaining usual activity level.\par Decreased night sweats-rare now. No fevers.  No weight loss.  No new lymph node complaints.  No complaints of cough, shortness of breath.\par No GI complaints.  No complaints of bone pain.\par \par \par \par \par

## 2020-10-07 NOTE — PHYSICAL EXAM
[Fully active, able to carry on all pre-disease performance without restriction] : Status 0 - Fully active, able to carry on all pre-disease performance without restriction [Normal] : affect appropriate [de-identified] : non-toxic appearing [de-identified] : breathing appeared unlabored [de-identified] : coloration appeared normal

## 2020-10-07 NOTE — ASSESSMENT
[FreeTextEntry1] : Patient with lymphoproliferative disorder with mediastinal lymphadenopathy. Mediastinal lymph node pathology showed partial involvement of a lymph node with a CD5 positive, B-cell lymphoproliferative disorder. Peripheral blood flow cytometry, and bone marrow with CD5 negative monotypic B cells. Clinically, suggestive of a low grade, non-Hodgkin's lymphoma-possibly atypical marginal zone or CLL.\par Status post course of weekly Rituxan x4. \par Patient is clinically stable at this time with no new symptomatology.  Expectant surveillance.\par Patient was given the opportunity to ask questions.  His questions have been answered to his apparent satisfaction.  He is agreeable to recommended follow-up.

## 2020-10-07 NOTE — CONSULT LETTER
[Dear  ___] : Dear  [unfilled], [Courtesy Letter:] : I had the pleasure of seeing your patient, [unfilled], in my office today. [Please see my note below.] : Please see my note below. [Consult Closing:] : Thank you very much for allowing me to participate in the care of this patient.  If you have any questions, please do not hesitate to contact me. [Sincerely,] : Sincerely, [FreeTextEntry3] : Judy Gilbert MD

## 2020-11-17 NOTE — H&P PST ADULT - BMI (KG/M2)
[facility] [place] PHYSICAL THERAPY EVALUATION  Dong Graves, 1/30/1932, 4125/4125-A, 11/17/2020    History  [place]:  The primary encounter diagnosis was Altered mental status, unspecified altered mental status type. Diagnoses of Hypotension, unspecified hypotension type and Near syncope were also pertinent to this visit. Patient  has a past medical history of Arthritis, Blind left eye, Cancer ([address] COPD (chronic obstructive pulmonary disease) ([address] Dementia ([address] Depression, GERD (gastroesophageal reflux disease), Hypertension, Pneumonia, Restless legs syndrome, Tremors of nervous system, and Unspecified cerebral artery occlusion with cerebral infarction. Patient  has a past surgical history that includes Hysterectomy (1972); Spine surgery (1995); Abdomen surgery; fracture surgery; Appendectomy; Endoscopy, colon, diagnostic; vascular surgery; back surgery; Colonoscopy; and eye surgery. Subjective:  Patient states:  Amenable to therapy, confused. Pain:  Denies. Communication with other providers:  Handoff to RN, co-eval with OT.    Restrictions: Fall risk    Home Setup/Prior level of function  Social/Functional History  Lives With: Family(Granddaughter and great-granddaughter)  Type of Home: House  Home Layout: Two level, Performs ADL's on one level, Able to Live on Main level with bedroom/bathroom  Home Access: Stairs to enter with rails  Entrance Stairs - Number of Steps: 4  Entrance Stairs - Rails: Right  Home Equipment: 4 wheeled walker  ADL Assistance: Needs assistance(granddaughter assists with bathing/dressing/toilet; able to self-feed with setup)  Homemaking Assistance: Needs assistance  Homemaking Responsibilities: No  Ambulation Assistance: Needs assistance(pt reports she always has hand-held assist or physical support when using her 4ww at home)  Transfer Assistance: Needs assistance(supervision/steadying assist PRN)  Active : No  Occupation: Retired  Additional Comments: Pt not a fully reliable historian this date. All above information should be verified by granddaughter. Examination of body systems (includes body structures/functions, activity/participation limitations):  · Observation:  Sitting up in chair upon arrival  · Vision:  Legally blind  · Hearing:  [facility]  · Cardiopulmonary:  No O2 needs  · Cognition: impaired, baseline dementia, conversational but demonstrates confusion, see OT/SLP note for further evaluation. Musculoskeletal  · ROM R/L:  WFL. · Strength R/L:  4+/5, min weakness in function and endurance. · Neuro:  Demonstrates tremors in LE    · Gait pattern: decreased romeo and stride with LE tremoring. Mobility:  · Transfers: CGA  · Sitting balance:  SBA. · Standing balance:  CGA. · Gait: CGA-min A    [facility] 6 Clicks Inpatient Mobility:  17/24    Treatment:  Transfers: STS from chair, CGA with RW, min cues to scoot forward in chair. Gait: ambulated x50ft with RW, noted LE tremoring throughout gait, CGA for safety, no formal LOB but decreased endurance noted. Min assist for AD negotiation d/t visual deficits. Encouragement to continue with gait. Safety: patient left in chair with chair alarm, call light within reach, RN notified, gait belt used. Assessment:  Patient is a 78 yo female who presents with AMS after recent d/c home. Patient does have a history of dementia and is legally blind, per charting has assist from family at home. Patient demonstrates need for 24/7 hands on assistance d/t tremors and visual deficit, unlikely that intensive rehab will effectively change mobility. Would recommend d/c home with 24/7 assist from family and may need to progress to use of further equipment such as WC if safety is a concern. Complexity: Moderate  Prognosis: Good, no significant barriers to participation at this time. Plan Times per week: 1-2/week, 1 week,      Equipment: sufficient equipment at this time.     Goals:  Short term goals  Time Frame for Short term goals: 1 week  Short term goal 1: Patient will perform supine to sit SBA  Short term goal 2: Patient will perform STS with CGA. Short term goal 3: Patient will ambulate x50ft CGA with LRAD. Treatment plan:  Bed mobility, transfers, balance, gait, TA, TX. Recommendations for NURSING mobility: ambulate to BR with gait belt.     Time:   Time in: 0915  Time out: 0939  Timed treatment minutes: 10  Total time: 24    Electronically signed by:    Anuj Hwang, PT  11/17/2020, 3:59 PM 35

## 2020-12-11 NOTE — ED ADULT NURSE NOTE - NS ED NURSE IV DC DT
Refilled medication and e-scribed to pharmacy. Confirm prescription was due. Make sure MOR and urine drug screen is up to date. 11-Sep-2019 06:19

## 2021-01-13 ENCOUNTER — OUTPATIENT (OUTPATIENT)
Dept: OUTPATIENT SERVICES | Facility: HOSPITAL | Age: 56
LOS: 1 days | Discharge: HOME | End: 2021-01-13

## 2021-01-19 ENCOUNTER — APPOINTMENT (OUTPATIENT)
Dept: HEMATOLOGY ONCOLOGY | Facility: CLINIC | Age: 56
End: 2021-01-19
Payer: COMMERCIAL

## 2021-01-19 PROCEDURE — 99213 OFFICE O/P EST LOW 20 MIN: CPT | Mod: 95

## 2021-01-19 RX ORDER — HYDROCORTISONE 25 MG/G
2.5 CREAM TOPICAL
Qty: 30 | Refills: 0 | Status: ACTIVE | COMMUNITY
Start: 2021-01-05

## 2021-01-19 RX ORDER — BUPROPION HYDROCHLORIDE 150 MG/1
150 TABLET, EXTENDED RELEASE ORAL
Qty: 30 | Refills: 0 | Status: ACTIVE | COMMUNITY
Start: 2020-04-03

## 2021-01-19 RX ORDER — MELOXICAM 15 MG/1
15 TABLET ORAL
Qty: 30 | Refills: 0 | Status: ACTIVE | COMMUNITY
Start: 2020-10-03

## 2021-01-19 RX ORDER — AMOXICILLIN AND CLAVULANATE POTASSIUM 875; 125 MG/1; MG/1
875-125 TABLET, COATED ORAL
Qty: 20 | Refills: 0 | Status: ACTIVE | COMMUNITY
Start: 2021-01-09

## 2021-01-19 RX ORDER — FEXOFENADINE HYDROCHLORIDE AND PSEUDOEPHEDRINE HYDROCHLORIDE 180; 240 MG/1; MG/1
180-240 TABLET, FILM COATED, EXTENDED RELEASE ORAL
Qty: 30 | Refills: 0 | Status: ACTIVE | COMMUNITY
Start: 2021-01-04

## 2021-01-19 NOTE — RESULTS/DATA
[FreeTextEntry1] : Creatinine 0.57, sodium 139, calcium 9.1, total protein 6.1, total bilirubin 0.3, alkaline phosphatase 90, AST 15, ALT 15, hemoglobin 13.1, hematocrit 38.5, WBC 10 with 70% neutrophils, 22% lymphocytes, platelet count 339,000.

## 2021-01-19 NOTE — REASON FOR VISIT
[Follow-Up Visit] : a follow-up [Other Location: e.g. School (Enter Location, City,State)___] : at [unfilled], at the time of the visit. [Medical Office: ([facility])___] : at the medical office located in  [Verbal consent obtained from patient] : the patient, [unfilled] [FreeTextEntry2] : GUILLAUME

## 2021-01-19 NOTE — HISTORY OF PRESENT ILLNESS
[de-identified] : 9/2019-Presented to the ED with a 1 day h/o hemoptysis. 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 Bone marrow biopsy, and bone marrow aspirate showed trilineage hematopoiesis with increased iron stores. Flow cytometry showed CD5 negative monotypic B cells with no significant infiltrate in the bone marrow biopsy. Suspected atypical marginal zone lymphoma or CLL, when considering lymph node pathology, as well.\par Due to constitutional symptoms/night sweats-->Rituxan weekly x 4-completed 12/26/19. [de-identified] : Telehealth visit due to COVID pandemic.\par Saw PCP approx. 2 weeks ago in f/u. Told lab work was good.\par Occasionally sluggish, but remains active, working.\par Decreased night sweats-rare now. No fevers.  No new lymph node complaints.  No complaints of cough, shortness of breath.\par No GI complaints.  No complaints of bone pain.\par \par \par \par \par

## 2021-01-19 NOTE — PHYSICAL EXAM
[Fully active, able to carry on all pre-disease performance without restriction] : Status 0 - Fully active, able to carry on all pre-disease performance without restriction [Normal] : affect appropriate [de-identified] : non-toxic appearing [de-identified] : breathing appeared unlabored [de-identified] : coloration appeared normal

## 2021-01-19 NOTE — ASSESSMENT
[FreeTextEntry1] : Patient with lymphoproliferative disorder with mediastinal lymphadenopathy. Mediastinal lymph node pathology showed partial involvement of a lymph node with a CD5 positive, B-cell lymphoproliferative disorder. Peripheral blood flow cytometry, and bone marrow with CD5 negative monotypic B cells. Clinically, suggestive of a low grade, non-Hodgkin's lymphoma-possibly atypical marginal zone or CLL.\par Status post course of weekly Rituxan x 4.\par Lab work reviewed-borderline/slightly decreased hemoglobin noted. No overt bleeding reported at present. Interval f/u labs ordered including iron studies, B 12/folate. If anemia refractory-->further evaluation warranted. Discussed with patient regarding recommendation for updating his screening colonoscopy-he will consider.\par T/C f/u CT chest next visit.\par Patient was given the opportunity to ask questions.  His questions have been answered to his apparent satisfaction at this time.  He expressed his understanding and willingness to comply with recommended follow-up.

## 2021-03-24 NOTE — RESULTS/DATA
PAST SURGICAL HISTORY:  No significant past surgical history      [FreeTextEntry1] : CT A/P:\par IMPRESSION: \par No evidence of malignancy in the abdomen and pelvis. \par Minimal ground glass opacities in the lung bases of uncertain significance.\par CT Neck:\par IMPRESSION: \par Moderate nonspecific soft tissue is noted at the tongue base. Although this could reflect lymphoid \par hypertrophy, lymphoma or other tongue base mass can't be entirely excluded. Correlate with direct \par visualization findings. The adenoids of the nasopharynx and palatine tonsils are not enlarged. The \par remainder of the aerodigestive tract is grossly unremarkable. \par Nonspecific mildly enlarged lymph nodes are present in the bilateral levels 3 and left level 2 \par locations. No necrotic lymph nodes are present. PAST SURGICAL HISTORY:  Pacemaker

## 2021-04-01 NOTE — ASU PATIENT PROFILE, ADULT - NS SC CAGE ALCOHOL CUT DOWN
Medication:   Requested Prescriptions     Pending Prescriptions Disp Refills    rosuvastatin (CRESTOR) 40 MG tablet 90 tablet 3     Sig: Take 1 tablet by mouth daily       Last Filled:      Patient Phone Number: [phone] (home) [phone] (work)    Last appt: Visit date not found   Next appt: Visit date not found    Last Lipid:   Lab Results   Component Value Date    CHOL 230 06/23/2020    TRIG 307 06/23/2020    HDL 42 06/23/2020    HDL 34 03/06/2012    LDLCALC see below 06/23/2020
no

## 2021-05-03 ENCOUNTER — OUTPATIENT (OUTPATIENT)
Dept: OUTPATIENT SERVICES | Facility: HOSPITAL | Age: 56
LOS: 1 days | Discharge: HOME | End: 2021-05-03

## 2021-05-03 DIAGNOSIS — R59.0 LOCALIZED ENLARGED LYMPH NODES: ICD-10-CM

## 2021-06-02 ENCOUNTER — APPOINTMENT (OUTPATIENT)
Dept: HEMATOLOGY ONCOLOGY | Facility: CLINIC | Age: 56
End: 2021-06-02
Payer: COMMERCIAL

## 2021-06-02 PROCEDURE — 99213 OFFICE O/P EST LOW 20 MIN: CPT | Mod: 95

## 2021-06-02 NOTE — REASON FOR VISIT
[Home] : at home, [unfilled] , at the time of the visit. [Medical Office: ([facility])___] : at the medical office located in  [Verbal consent obtained from patient] : the patient, [unfilled] [Follow-Up Visit] : a follow-up [FreeTextEntry2] : [facility]

## 2021-06-02 NOTE — RESULTS/DATA
[FreeTextEntry1] : 4/30/2021–calcium/total bilirubin/alkaline phosphatase/AST/ALT within normal limits.  .  CBC within normal limits.

## 2021-06-02 NOTE — PHYSICAL EXAM
[Fully active, able to carry on all pre-disease performance without restriction] : Status 0 - Fully active, able to carry on all pre-disease performance without restriction [Normal] : affect appropriate [de-identified] : non-toxic appearing [de-identified] : coloration appeared normal [de-identified] : breathing appeared unlabored

## 2021-06-02 NOTE — ASSESSMENT
[FreeTextEntry1] : Lab work reviewed with patient.  \par Patient with lymphoproliferative disorder with mediastinal lymphadenopathy (9/2019). Mediastinal lymph node pathology showed partial involvement of a lymph node with a CD5 positive, B-cell lymphoproliferative disorder. Peripheral blood flow cytometry, and bone marrow with CD5 negative monotypic B cells. Clinically, suggestive of a low grade, non-Hodgkin's lymphoma-possibly atypical marginal zone or CLL.\par Status post course of weekly Rituxan x4 (12/2019). \par Clinically stable currently. To have f/u CT chest prior to next visit.\par \par Patient was given the opportunity to ask questions.  His questions have been answered to his apparent satisfaction at this time.  He expressed his understanding and willingness to comply with recommended follow-up.

## 2021-06-02 NOTE — CONSULT LETTER
[Courtesy Letter:] : I had the pleasure of seeing your patient, [unfilled], in my office today. [Please see my note below.] : Please see my note below. [Consult Closing:] : Thank you very much for allowing me to participate in the care of this patient.  If you have any questions, please do not hesitate to contact me. [Sincerely,] : Sincerely, [DrJatin  ___] : Dr. LION [Dear  ___] : Dear  [unfilled], [FreeTextEntry3] : Judy Gilbert MD

## 2021-06-02 NOTE — HISTORY OF PRESENT ILLNESS
[de-identified] : 9/2019-Presented to the ED with a 1 day h/o hemoptysis. 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 Bone marrow biopsy, and bone marrow aspirate showed trilineage hematopoiesis with increased iron stores. Flow cytometry showed CD5 negative monotypic B cells with no significant infiltrate in the bone marrow biopsy. Suspected atypical marginal zone lymphoma or CLL, when considering lymph node pathology, as well.\par Due to constitutional symptoms/night sweats-->Rituxan weekly x 4-completed 12/26/19. 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  No new lymph node complaints.  No complaints of cough, shortness of breath.\par No GI complaints.  No complaints of bone pain.\par \par \par \par \par

## 2021-08-31 ENCOUNTER — OUTPATIENT (OUTPATIENT)
Dept: OUTPATIENT SERVICES | Facility: HOSPITAL | Age: 56
LOS: 1 days | Discharge: HOME | End: 2021-08-31

## 2021-08-31 DIAGNOSIS — R59.0 LOCALIZED ENLARGED LYMPH NODES: ICD-10-CM

## 2021-09-01 ENCOUNTER — APPOINTMENT (OUTPATIENT)
Dept: HEMATOLOGY ONCOLOGY | Facility: CLINIC | Age: 56
End: 2021-09-01

## 2021-09-10 ENCOUNTER — OUTPATIENT (OUTPATIENT)
Dept: OUTPATIENT SERVICES | Facility: HOSPITAL | Age: 56
LOS: 1 days | Discharge: HOME | End: 2021-09-10

## 2021-09-10 DIAGNOSIS — R59.0 LOCALIZED ENLARGED LYMPH NODES: ICD-10-CM

## 2021-10-10 ENCOUNTER — OUTPATIENT (OUTPATIENT)
Dept: OUTPATIENT SERVICES | Facility: HOSPITAL | Age: 56
LOS: 1 days | Discharge: HOME | End: 2021-10-10

## 2021-10-13 ENCOUNTER — APPOINTMENT (OUTPATIENT)
Dept: HEMATOLOGY ONCOLOGY | Facility: CLINIC | Age: 56
End: 2021-10-13
Payer: COMMERCIAL

## 2021-10-13 PROCEDURE — 99442: CPT

## 2021-10-13 NOTE — ASSESSMENT
[FreeTextEntry1] : CT chest, lab results reviewed with patient.  \par Patient with lymphoproliferative disorder with mediastinal lymphadenopathy (9/2019). Mediastinal lymph node pathology showed partial involvement of a lymph node with a CD5 positive, B-cell lymphoproliferative disorder. Peripheral blood flow cytometry, and bone marrow with CD5 negative monotypic B cells. Clinically, suggestive of a low grade, non-Hodgkin's lymphoma-possibly atypical marginal zone or CLL.\par Status post course of weekly Rituxan x 4 (12/2019). \par Clinically stable currently.  Radiologist did not compare most recent CT chest with prior imaging.  Upon comparison of dimensions of lymph nodes given, may have minimal increase in size of reference lymph nodes compared to 6/2020 study. Suggest short interval follow-up CT scan of the chest (for comparison to most recent).  If clear continued progression of disease on CT chest, to consider follow-up PET/CT scan at that point/further evaluation.\par Discussed with patient him having [facility] scan sent to [facility] radiology for comparison-he prefers to have short interval f/u CT scan as above (and will do this at [facility]-had changed due to a change in insurance).\par \par Patient was given the opportunity to ask questions.  His questions have been answered to his apparent satisfaction at this time.  He expressed his understanding and willingness to comply with recommended follow-up.

## 2021-10-13 NOTE — HISTORY OF PRESENT ILLNESS
[de-identified] : 9/2019-Presented to the ED with a 1 day h/o hemoptysis. 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 Bone marrow biopsy, and bone marrow aspirate showed trilineage hematopoiesis with increased iron stores. Flow cytometry showed CD5 negative monotypic B cells with no significant infiltrate in the bone marrow biopsy. Suspected atypical marginal zone lymphoma or CLL, when considering lymph node pathology, as well.\par Due to constitutional symptoms/night sweats-->Rituxan weekly x 4-completed 12/26/19.\par \par 10/7/2021–CT chest–station 4R LN 1.7 cm, 10 R LN 1.5 cm. 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  No new lymph node complaints.  No complaints of cough, shortness of breath.\par No GI complaints.  No complaints of bone pain.\par \par \par \par \par

## 2021-10-13 NOTE — RESULTS/DATA
[FreeTextEntry1] : CT chest–1.7 cm station 4R (lower paratracheal) lymph node and 1.5 cm Station 10 R (hilar) lymph node.  Diffuse centrilobular emphysematous changes.  Coronary calcifications.\par \par H/H=14.3/42.8, WBC 11.8 with 71% neutrophils, 22% lymphs, platelet count 295,000, .

## 2021-10-13 NOTE — REASON FOR VISIT
[Home] : at home, [unfilled] , at the time of the visit. [Medical Office: ([facility])___] : at the medical office located in  [Verbal consent obtained from patient] : the patient, [unfilled] [Follow-Up Visit] : a follow-up [FreeTextEntry2] : [facility]

## 2022-02-15 ENCOUNTER — NON-APPOINTMENT (OUTPATIENT)
Age: 57
End: 2022-02-15

## 2022-03-21 ENCOUNTER — OUTPATIENT (OUTPATIENT)
Dept: OUTPATIENT SERVICES | Facility: HOSPITAL | Age: 57
LOS: 1 days | Discharge: HOME | End: 2022-03-21

## 2022-03-21 DIAGNOSIS — R59.1 GENERALIZED ENLARGED LYMPH NODES: ICD-10-CM

## 2022-04-04 ENCOUNTER — OUTPATIENT (OUTPATIENT)
Dept: OUTPATIENT SERVICES | Facility: HOSPITAL | Age: 57
LOS: 1 days | Discharge: HOME | End: 2022-04-04

## 2022-04-04 DIAGNOSIS — R59.0 LOCALIZED ENLARGED LYMPH NODES: ICD-10-CM

## 2022-04-06 ENCOUNTER — APPOINTMENT (OUTPATIENT)
Dept: HEMATOLOGY ONCOLOGY | Facility: CLINIC | Age: 57
End: 2022-04-06
Payer: COMMERCIAL

## 2022-04-06 VITALS
DIASTOLIC BLOOD PRESSURE: 75 MMHG | SYSTOLIC BLOOD PRESSURE: 144 MMHG | TEMPERATURE: 96.9 F | HEIGHT: 73.62 IN | WEIGHT: 308.63 LBS | OXYGEN SATURATION: 98 % | HEART RATE: 66 BPM | RESPIRATION RATE: 15 BRPM | BODY MASS INDEX: 40.03 KG/M2

## 2022-04-06 PROCEDURE — 99072 ADDL SUPL MATRL&STAF TM PHE: CPT

## 2022-04-06 PROCEDURE — 99213 OFFICE O/P EST LOW 20 MIN: CPT

## 2022-04-06 NOTE — PHYSICAL EXAM
[Fully active, able to carry on all pre-disease performance without restriction] : Status 0 - Fully active, able to carry on all pre-disease performance without restriction [Normal] : affect appropriate [de-identified] : non-toxic appearing [de-identified] : breathing appeared unlabored [de-identified] : coloration appeared normal

## 2022-04-06 NOTE — RESULTS/DATA
[FreeTextEntry1] : 10/11/2021–hemoglobin 14.3, hematocrit 42.8, MCV 85, WBC 11.8 with 71% neutrophils, 22% lymphocytes, lately count 295,000.\par 2/10/2022–CT chest–interval decrease in right paratracheal lymphadenopathy.  Remaining lymph nodes unchanged.  Mosaic appearance of the lung parenchyma unchanged.  Emphysematous changes unchanged.  Scarlike opacity in the lingula segment of the left upper lobe with a mild interval increase in size when compared to prior study.

## 2022-04-06 NOTE — HISTORY OF PRESENT ILLNESS
[de-identified] : 9/2019-Presented to the ED with a 1 day h/o hemoptysis. 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 Bone marrow biopsy, and bone marrow aspirate showed trilineage hematopoiesis with increased iron stores. Flow cytometry showed CD5 negative monotypic B cells with no significant infiltrate in the bone marrow biopsy. Suspected atypical marginal zone lymphoma or CLL, when considering lymph node pathology, as well.\par Due to constitutional symptoms/night sweats-->Rituxan weekly x 4-completed 12/26/19.\par \par 10/7/2021–CT chest–station 4R LN 1.7 cm, 10 R LN 1.5 cm. 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  No new lymph node complaints.  No complaints of cough, shortness of breath.\par No GI complaints.  No complaints of bone pain.\par Decreasing tobacco-< 1/12 PPD.\par \par Got COVID vaccine (J and J)-4/2021. Then had J& J again in 11/2021 and 1/2022.\par \par \par

## 2022-04-06 NOTE — ASSESSMENT
[FreeTextEntry1] : CBC, CT chest results reviewed.\par Patient with lymphoproliferative disorder with mediastinal lymphadenopathy (9/2019). Mediastinal lymph node pathology showed partial involvement of a lymph node with a CD5 positive, B-cell lymphoproliferative disorder. Peripheral blood flow cytometry, and bone marrow with CD5 negative monotypic B cells. Clinically, suggestive of a low grade, non-Hodgkin's lymphoma-possibly atypical marginal zone or CLL.\par Status post course of weekly Rituxan x 4 (12/2019). \par \par Clinically stable currently.  Continue interval f/u chest imaging. If clear progression of disease on CT chest, to consider follow-up PET/CT scan at that point/further evaluation.\par To have next lab work with PCP (patient stated lab work restrictions due to new insurance).\par \par Smoking cessation encouraged.\par \par Advised updating screening colonoscopy.\par \par Patient was given the opportunity to ask questions.  His questions have been answered to his apparent satisfaction at this time.  He expressed his understanding and willingness to comply with recommended follow-up.

## 2022-04-11 ENCOUNTER — EMERGENCY (EMERGENCY)
Facility: HOSPITAL | Age: 57
LOS: 1 days | Discharge: HOME | End: 2022-04-11
Attending: INTERNAL MEDICINE | Admitting: INTERNAL MEDICINE
Payer: COMMERCIAL

## 2022-04-11 VITALS
OXYGEN SATURATION: 98 % | TEMPERATURE: 98 F | RESPIRATION RATE: 17 BRPM | SYSTOLIC BLOOD PRESSURE: 122 MMHG | HEIGHT: 74.5 IN | WEIGHT: 309.53 LBS | DIASTOLIC BLOOD PRESSURE: 79 MMHG | HEART RATE: 66 BPM

## 2022-04-11 VITALS
OXYGEN SATURATION: 95 % | DIASTOLIC BLOOD PRESSURE: 71 MMHG | TEMPERATURE: 98 F | SYSTOLIC BLOOD PRESSURE: 112 MMHG | HEART RATE: 64 BPM | RESPIRATION RATE: 16 BRPM

## 2022-04-11 LAB
ALBUMIN SERPL ELPH-MCNC: 3.9 G/DL — SIGNIFICANT CHANGE UP (ref 3.3–5)
ALP SERPL-CCNC: 97 U/L — SIGNIFICANT CHANGE UP (ref 40–120)
ALT FLD-CCNC: 24 U/L — SIGNIFICANT CHANGE UP (ref 10–45)
ANION GAP SERPL CALC-SCNC: 9 MMOL/L — SIGNIFICANT CHANGE UP (ref 5–17)
APPEARANCE UR: CLEAR — SIGNIFICANT CHANGE UP
AST SERPL-CCNC: 19 U/L — SIGNIFICANT CHANGE UP (ref 10–40)
BACTERIA # UR AUTO: NEGATIVE /HPF — SIGNIFICANT CHANGE UP
BASOPHILS # BLD AUTO: 0.06 K/UL — SIGNIFICANT CHANGE UP (ref 0–0.2)
BASOPHILS NFR BLD AUTO: 0.5 % — SIGNIFICANT CHANGE UP (ref 0–2)
BILIRUB SERPL-MCNC: 0.5 MG/DL — SIGNIFICANT CHANGE UP (ref 0.2–1.2)
BILIRUB UR-MCNC: NEGATIVE — SIGNIFICANT CHANGE UP
BUN SERPL-MCNC: 12 MG/DL — SIGNIFICANT CHANGE UP (ref 7–23)
CALCIUM SERPL-MCNC: 9.3 MG/DL — SIGNIFICANT CHANGE UP (ref 8.4–10.5)
CHLORIDE SERPL-SCNC: 103 MMOL/L — SIGNIFICANT CHANGE UP (ref 96–108)
CO2 SERPL-SCNC: 28 MMOL/L — SIGNIFICANT CHANGE UP (ref 22–31)
COLOR SPEC: YELLOW — SIGNIFICANT CHANGE UP
CREAT SERPL-MCNC: 0.74 MG/DL — SIGNIFICANT CHANGE UP (ref 0.5–1.3)
DIFF PNL FLD: ABNORMAL
EGFR: 106 ML/MIN/1.73M2 — SIGNIFICANT CHANGE UP
EOSINOPHIL # BLD AUTO: 0.13 K/UL — SIGNIFICANT CHANGE UP (ref 0–0.5)
EOSINOPHIL NFR BLD AUTO: 1.1 % — SIGNIFICANT CHANGE UP (ref 0–6)
EPI CELLS # UR: SIGNIFICANT CHANGE UP
GLUCOSE SERPL-MCNC: 101 MG/DL — HIGH (ref 70–99)
GLUCOSE UR QL: NEGATIVE — SIGNIFICANT CHANGE UP
HCT VFR BLD CALC: 45.3 % — SIGNIFICANT CHANGE UP (ref 39–50)
HGB BLD-MCNC: 15.2 G/DL — SIGNIFICANT CHANGE UP (ref 13–17)
IMM GRANULOCYTES NFR BLD AUTO: 0.4 % — SIGNIFICANT CHANGE UP (ref 0–1.5)
KETONES UR-MCNC: NEGATIVE — SIGNIFICANT CHANGE UP
LEUKOCYTE ESTERASE UR-ACNC: NEGATIVE — SIGNIFICANT CHANGE UP
LIDOCAIN IGE QN: 86 U/L — SIGNIFICANT CHANGE UP (ref 73–393)
LYMPHOCYTES # BLD AUTO: 1.97 K/UL — SIGNIFICANT CHANGE UP (ref 1–3.3)
LYMPHOCYTES # BLD AUTO: 17 % — SIGNIFICANT CHANGE UP (ref 13–44)
MCHC RBC-ENTMCNC: 29.1 PG — SIGNIFICANT CHANGE UP (ref 27–34)
MCHC RBC-ENTMCNC: 33.6 GM/DL — SIGNIFICANT CHANGE UP (ref 32–36)
MCV RBC AUTO: 86.8 FL — SIGNIFICANT CHANGE UP (ref 80–100)
MONOCYTES # BLD AUTO: 0.57 K/UL — SIGNIFICANT CHANGE UP (ref 0–0.9)
MONOCYTES NFR BLD AUTO: 4.9 % — SIGNIFICANT CHANGE UP (ref 2–14)
NEUTROPHILS # BLD AUTO: 8.82 K/UL — HIGH (ref 1.8–7.4)
NEUTROPHILS NFR BLD AUTO: 76.1 % — SIGNIFICANT CHANGE UP (ref 43–77)
NITRITE UR-MCNC: NEGATIVE — SIGNIFICANT CHANGE UP
NRBC # BLD: 0 /100 WBCS — SIGNIFICANT CHANGE UP (ref 0–0)
PH UR: 6 — SIGNIFICANT CHANGE UP (ref 5–8)
PLATELET # BLD AUTO: 312 K/UL — SIGNIFICANT CHANGE UP (ref 150–400)
POTASSIUM SERPL-MCNC: 4.1 MMOL/L — SIGNIFICANT CHANGE UP (ref 3.5–5.3)
POTASSIUM SERPL-SCNC: 4.1 MMOL/L — SIGNIFICANT CHANGE UP (ref 3.5–5.3)
PROT SERPL-MCNC: 7.3 G/DL — SIGNIFICANT CHANGE UP (ref 6–8.3)
PROT UR-MCNC: 15
RBC # BLD: 5.22 M/UL — SIGNIFICANT CHANGE UP (ref 4.2–5.8)
RBC # FLD: 13.2 % — SIGNIFICANT CHANGE UP (ref 10.3–14.5)
RBC CASTS # UR COMP ASSIST: SIGNIFICANT CHANGE UP /HPF (ref 0–4)
SARS-COV-2 RNA SPEC QL NAA+PROBE: SIGNIFICANT CHANGE UP
SODIUM SERPL-SCNC: 140 MMOL/L — SIGNIFICANT CHANGE UP (ref 135–145)
SP GR SPEC: 1.01 — SIGNIFICANT CHANGE UP (ref 1.01–1.02)
UROBILINOGEN FLD QL: NEGATIVE — SIGNIFICANT CHANGE UP
WBC # BLD: 11.6 K/UL — HIGH (ref 3.8–10.5)
WBC # FLD AUTO: 11.6 K/UL — HIGH (ref 3.8–10.5)
WBC UR QL: NEGATIVE /HPF — SIGNIFICANT CHANGE UP (ref 0–5)

## 2022-04-11 PROCEDURE — 36415 COLL VENOUS BLD VENIPUNCTURE: CPT

## 2022-04-11 PROCEDURE — 87086 URINE CULTURE/COLONY COUNT: CPT

## 2022-04-11 PROCEDURE — 93010 ELECTROCARDIOGRAM REPORT: CPT

## 2022-04-11 PROCEDURE — 80053 COMPREHEN METABOLIC PANEL: CPT

## 2022-04-11 PROCEDURE — 99285 EMERGENCY DEPT VISIT HI MDM: CPT

## 2022-04-11 PROCEDURE — 93005 ELECTROCARDIOGRAM TRACING: CPT

## 2022-04-11 PROCEDURE — 74177 CT ABD & PELVIS W/CONTRAST: CPT | Mod: MA

## 2022-04-11 PROCEDURE — 87635 SARS-COV-2 COVID-19 AMP PRB: CPT

## 2022-04-11 PROCEDURE — 83690 ASSAY OF LIPASE: CPT

## 2022-04-11 PROCEDURE — 99284 EMERGENCY DEPT VISIT MOD MDM: CPT | Mod: 25

## 2022-04-11 PROCEDURE — 81001 URINALYSIS AUTO W/SCOPE: CPT

## 2022-04-11 PROCEDURE — 85025 COMPLETE CBC W/AUTO DIFF WBC: CPT

## 2022-04-11 PROCEDURE — 74177 CT ABD & PELVIS W/CONTRAST: CPT | Mod: 26,MA

## 2022-04-11 PROCEDURE — 96374 THER/PROPH/DIAG INJ IV PUSH: CPT | Mod: XU

## 2022-04-11 RX ORDER — ACETAMINOPHEN 500 MG
1 TABLET ORAL
Qty: 30 | Refills: 0
Start: 2022-04-11 | End: 2022-04-20

## 2022-04-11 RX ORDER — ONDANSETRON 8 MG/1
1 TABLET, FILM COATED ORAL
Qty: 30 | Refills: 0
Start: 2022-04-11 | End: 2022-04-20

## 2022-04-11 RX ORDER — SODIUM CHLORIDE 9 MG/ML
1000 INJECTION INTRAMUSCULAR; INTRAVENOUS; SUBCUTANEOUS ONCE
Refills: 0 | Status: COMPLETED | OUTPATIENT
Start: 2022-04-11 | End: 2022-04-11

## 2022-04-11 RX ORDER — FAMOTIDINE 10 MG/ML
1 INJECTION INTRAVENOUS
Qty: 20 | Refills: 0
Start: 2022-04-11 | End: 2022-04-20

## 2022-04-11 RX ORDER — OXYCODONE HYDROCHLORIDE 5 MG/1
1 TABLET ORAL
Qty: 0 | Refills: 0 | DISCHARGE

## 2022-04-11 RX ORDER — FLUTICASONE FUROATE AND VILANTEROL TRIFENATATE 100; 25 UG/1; UG/1
1 POWDER RESPIRATORY (INHALATION)
Qty: 0 | Refills: 0 | DISCHARGE

## 2022-04-11 RX ORDER — ONDANSETRON 8 MG/1
4 TABLET, FILM COATED ORAL ONCE
Refills: 0 | Status: COMPLETED | OUTPATIENT
Start: 2022-04-11 | End: 2022-04-11

## 2022-04-11 RX ORDER — LIDOCAINE 4 G/100G
1 CREAM TOPICAL
Qty: 20 | Refills: 0
Start: 2022-04-11 | End: 2022-04-20

## 2022-04-11 RX ADMIN — SODIUM CHLORIDE 1000 MILLILITER(S): 9 INJECTION INTRAMUSCULAR; INTRAVENOUS; SUBCUTANEOUS at 11:30

## 2022-04-11 RX ADMIN — ONDANSETRON 4 MILLIGRAM(S): 8 TABLET, FILM COATED ORAL at 12:03

## 2022-04-11 NOTE — ED PROVIDER NOTE - PATIENT PORTAL LINK FT
You can access the FollowMyHealth Patient Portal offered by [facility] by registering at the following website: [email or web address]. By joining [facility]’s FollowMyHealth portal, you will also be able to view your health information using other applications (apps) compatible with our system.

## 2022-04-11 NOTE — ED PROVIDER NOTE - PHYSICAL EXAMINATION
General:     NAD, well-nourished, well-appearing  Head:     NC/AT, EOMI, oral mucosa moist  Neck:     trachea midline  Lungs:     CTA b/l, no w/r/r  CVS:     S1S2, RRR, no m/g/r  Abd:     +BS, s/ RLQ R flank pain/nd, no organomegaly  Ext:    2+ radial and pedal pulses, no c/c/e  Neuro: AAOx3, no sensory/motor deficits

## 2022-04-11 NOTE — ED PROVIDER NOTE - CLINICAL SUMMARY MEDICAL DECISION MAKING FREE TEXT BOX
56 y m r flank pain 1 week hx of nhl in remission active smoker  onset gradual   locations r flank   duration 1 week   characteristics r flank pain rlq abd pain   context hx of non hodgkins lymphoma   aggravating factors motion   relieving factors rest  timming constant   severity moderate  ua trace blood cbc cmp nl ct abdomen neg

## 2022-04-11 NOTE — ED PROVIDER NOTE - NSFOLLOWUPINSTRUCTIONS_ED_ALL_ED_FT
Rest, drink plenty of fluids  Advance activity as tolerated  Continue all previously prescribed medications as directed  Follow up with your PMD 2-3 days- bring copies of your results  Return to the ER for worsening of condition

## 2022-04-11 NOTE — ED PROVIDER NOTE - OBJECTIVE STATEMENT
r flank pain 1 week hx of nhl in remission active smoker r flank pain 1 week hx of nhl in remission active smoker  onset gradual   locations r flank   duration 1 week   characteristics r flank pain rlq abd pain   context hx of non hodgkins lymphoma   aggravating factors motion   relieving factors rest  timming constant   severity moderate 56 y m r flank pain 1 week hx of nhl in remission active smoker  onset gradual   locations r flank   duration 1 week   characteristics r flank pain rlq abd pain   context hx of non hodgkins lymphoma   aggravating factors motion   relieving factors rest  timming constant   severity moderate

## 2022-04-11 NOTE — ED ADULT NURSE NOTE - OBJECTIVE STATEMENT
right flank pain associated with lethargy, chills, diarrhea intermittently. Denies frequency or hematuria. Skin warm, dry color pink, no edema.

## 2022-04-12 LAB
CULTURE RESULTS: NO GROWTH — SIGNIFICANT CHANGE UP
SPECIMEN SOURCE: SIGNIFICANT CHANGE UP

## 2022-07-12 ENCOUNTER — NON-APPOINTMENT (OUTPATIENT)
Age: 57
End: 2022-07-12

## 2022-08-03 ENCOUNTER — OUTPATIENT (OUTPATIENT)
Dept: OUTPATIENT SERVICES | Facility: HOSPITAL | Age: 57
LOS: 1 days | Discharge: HOME | End: 2022-08-03

## 2022-08-03 DIAGNOSIS — R59.1 GENERALIZED ENLARGED LYMPH NODES: ICD-10-CM

## 2022-08-03 PROBLEM — C85.90 NON-HODGKIN LYMPHOMA, UNSPECIFIED, UNSPECIFIED SITE: Chronic | Status: ACTIVE | Noted: 2022-04-11

## 2022-08-08 ENCOUNTER — APPOINTMENT (OUTPATIENT)
Dept: HEMATOLOGY ONCOLOGY | Facility: CLINIC | Age: 57
End: 2022-08-08

## 2023-03-27 ENCOUNTER — APPOINTMENT (OUTPATIENT)
Dept: ORTHOPEDIC SURGERY | Facility: CLINIC | Age: 58
End: 2023-03-27

## 2023-03-28 ENCOUNTER — OUTPATIENT (OUTPATIENT)
Dept: OUTPATIENT SERVICES | Facility: HOSPITAL | Age: 58
LOS: 1 days | Discharge: HOME | End: 2023-03-28

## 2023-03-28 DIAGNOSIS — R59.1 GENERALIZED ENLARGED LYMPH NODES: ICD-10-CM

## 2023-03-29 PROBLEM — D47.9 LYMPHOPROLIFERATIVE DISORDER: Status: ACTIVE | Noted: 2019-09-13

## 2023-03-29 PROBLEM — R59.0 MEDIASTINAL LYMPHADENOPATHY: Status: ACTIVE | Noted: 2019-09-13

## 2023-03-30 ENCOUNTER — APPOINTMENT (OUTPATIENT)
Dept: HEMATOLOGY ONCOLOGY | Facility: CLINIC | Age: 58
End: 2023-03-30
Payer: COMMERCIAL

## 2023-03-30 VITALS
HEIGHT: 73.58 IN | TEMPERATURE: 97 F | OXYGEN SATURATION: 98 % | BODY MASS INDEX: 39.75 KG/M2 | WEIGHT: 306.44 LBS | SYSTOLIC BLOOD PRESSURE: 138 MMHG | DIASTOLIC BLOOD PRESSURE: 84 MMHG | RESPIRATION RATE: 16 BRPM | HEART RATE: 78 BPM

## 2023-03-30 DIAGNOSIS — D47.9 NEOPLASM OF UNCERTAIN BEHAVIOR OF LYMPHOID, HEMATOPOIETIC AND RELATED TISSUE, UNSPECIFIED: ICD-10-CM

## 2023-03-30 DIAGNOSIS — R59.0 LOCALIZED ENLARGED LYMPH NODES: ICD-10-CM

## 2023-03-30 PROCEDURE — 99213 OFFICE O/P EST LOW 20 MIN: CPT

## 2023-03-30 NOTE — ASSESSMENT
[FreeTextEntry1] : Patient with lymphoproliferative disorder with mediastinal lymphadenopathy (9/2019). Mediastinal lymph node pathology showed partial involvement of a lymph node with a CD5 positive, B-cell lymphoproliferative disorder. Peripheral blood flow cytometry, and bone marrow with CD5 negative monotypic B cells. Clinically, suggestive of a low grade, non-Hodgkin's lymphoma-possibly atypical marginal zone or CLL.\par Status post course of weekly Rituxan x 4 (12/2019). \par \par Clinically stable currently.  Continue interval f/u chest imaging-next CT chest ordered. If clear progression of disease on CT chest, to consider follow-up PET/CT scan at that point/further evaluation.\par To have next lab work with PCP (patient stated lab work restrictions due to new insurance).\par \par Smoking cessation encouraged.\par \par Again advised updating screening colonoscopy.\par \par Patient was given the opportunity to ask questions.  His questions have been answered to his apparent satisfaction at this time.  He expressed his understanding and willingness to comply with recommended follow-up.

## 2023-03-30 NOTE — HISTORY OF PRESENT ILLNESS
[de-identified] : 9/2019-Presented to the ED with a 1 day h/o hemoptysis. 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 Bone marrow biopsy, and bone marrow aspirate showed trilineage hematopoiesis with increased iron stores. Flow cytometry showed CD5 negative monotypic B cells with no significant infiltrate in the bone marrow biopsy. Suspected atypical marginal zone lymphoma or CLL, when considering lymph node pathology, as well.\par Due to constitutional symptoms/night sweats-->Rituxan weekly x 4-completed 12/26/19.\par \par 10/7/2021–CT chest–station 4R LN 1.7 cm, 10 R LN 1.5 cm. [de-identified] : Generally feeling well.\par No fevers.  No new lymph node complaints.  No complaints of cough, shortness of breath.\par No GI complaints.  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

## 2023-03-30 NOTE — PHYSICAL EXAM
[Fully active, able to carry on all pre-disease performance without restriction] : Status 0 - Fully active, able to carry on all pre-disease performance without restriction [Normal] : affect appropriate [de-identified] : non-toxic appearing [de-identified] : breathing appeared unlabored [de-identified] : coloration appeared normal

## 2023-05-17 NOTE — ED ADULT NURSE NOTE - NEURO ASSESSMENT
WDL [FreeTextEntry1] : after consent anoscopy was done and rubber band ligation x 2 internal hemorrhoids. no complications.

## 2023-09-22 NOTE — H&P PST ADULT - MEDICATION HERBAL REMEDIES, PROFILE
Bleeding that does not stop/Swelling that gets worse/Pain not relieved by Medications/Inability to tolerate liquids or foods/Increased irritability or sluggishness no

## 2023-10-17 ENCOUNTER — APPOINTMENT (OUTPATIENT)
Dept: HEMATOLOGY ONCOLOGY | Facility: CLINIC | Age: 58
End: 2023-10-17

## 2024-11-13 ENCOUNTER — OUTPATIENT (OUTPATIENT)
Dept: OUTPATIENT SERVICES | Facility: HOSPITAL | Age: 59
LOS: 1 days | End: 2024-11-13
Payer: COMMERCIAL

## 2024-11-13 DIAGNOSIS — R05.9 COUGH, UNSPECIFIED: ICD-10-CM

## 2024-11-13 PROCEDURE — 71046 X-RAY EXAM CHEST 2 VIEWS: CPT

## 2024-11-13 PROCEDURE — 71046 X-RAY EXAM CHEST 2 VIEWS: CPT | Mod: 26
